# Patient Record
Sex: FEMALE | Race: WHITE | Employment: FULL TIME | URBAN - NONMETROPOLITAN AREA
[De-identification: names, ages, dates, MRNs, and addresses within clinical notes are randomized per-mention and may not be internally consistent; named-entity substitution may affect disease eponyms.]

---

## 2021-01-05 ENCOUNTER — HOSPITAL ENCOUNTER (EMERGENCY)
Age: 46
Discharge: HOME | End: 2021-01-05
Attending: FAMILY MEDICINE
Payer: MEDICAID

## 2021-01-05 VITALS
OXYGEN SATURATION: 98 % | WEIGHT: 230 LBS | RESPIRATION RATE: 20 BRPM | BODY MASS INDEX: 32.2 KG/M2 | HEART RATE: 88 BPM | DIASTOLIC BLOOD PRESSURE: 98 MMHG | HEIGHT: 71 IN | TEMPERATURE: 97.9 F | SYSTOLIC BLOOD PRESSURE: 152 MMHG

## 2021-01-05 DIAGNOSIS — J01.00 ACUTE NON-RECURRENT MAXILLARY SINUSITIS: Primary | ICD-10-CM

## 2021-01-05 PROCEDURE — 99282 EMERGENCY DEPT VISIT SF MDM: CPT

## 2021-01-05 RX ORDER — CEPHALEXIN 500 MG/1
500 CAPSULE ORAL 3 TIMES DAILY
Qty: 30 CAP | Refills: 0 | Status: SHIPPED | OUTPATIENT
Start: 2021-01-05 | End: 2021-01-15

## 2021-01-05 RX ORDER — FEXOFENADINE HCL 60 MG
60 TABLET ORAL DAILY
COMMUNITY

## 2021-01-05 NOTE — LETTER
[address] EMERGENCY DEPT 
[facility] [place] [address] 
[phone] Work/School Note Date: 1/5/2021 To Whom It May concern: 
 
Fahad Guerra was seen and treated today in the emergency room by the following provider(s): 
Attending Provider: Andrew Matson MD. Fahad Guerra is excused from work/school on 01/05/21 and 01/06/21. She is medically clear to return to work/school on 1/7/2021. Sincerely, Shayna Almanza MD

## 2021-01-05 NOTE — ED TRIAGE NOTES
Patient C/O sinus drainage, scratchy sore throat, and mild cough for 2 days. Denies fever, shortness of breath, and body aches.

## 2021-01-05 NOTE — ED PROVIDER NOTES
EMERGENCY DEPARTMENT HISTORY AND PHYSICAL EXAM      Date: 1/5/2021  Patient Name: Maurice Mcgregor    History of Presenting Illness     Chief Complaint   Patient presents with    Cough    Sore Throat       History Provided By: Patient    HPI: Maurice Mcgregor, 39 y.o. female presents to the ED with complaints of a cough and sore throat that began about two days ago and became worse yesterday. Patient states that she believes it could be a sinus infection and that she normally experiences itchy eyes and a runny nose normally due to allergies, but has had a cough, sore/irritated throat, congestion and the feeling of her ears being full/painful and that they are repeatedly popping. She also notes that she has been feeling pain along her forehead and maxillary sinuses. Denies any fever, chills, wheezing or shortness of breath. There are no other complaints, changes, or physical findings at this time. PCP: Seema Pardo MD    No current facility-administered medications on file prior to encounter. Current Outpatient Medications on File Prior to Encounter   Medication Sig Dispense Refill    fexofenadine (ALLEGRA) 60 mg tablet Take 60 mg by mouth daily. Indications: inflammation of the nose due to an allergy         Past History     Past Medical History:  Past Medical History:   Diagnosis Date    Anemia     Anxiety     Bipolar affective disorder ([facility] [address]     Gestational (pregnancy-induced) hypertension without significant proteinuria, complicating childbirth     Vertigo        Past Surgical History:  History reviewed. No pertinent surgical history. Family History:  History reviewed. No pertinent family history. Social History:  Social History     Tobacco Use    Smoking status: Never Smoker    Smokeless tobacco: Never Used   Substance Use Topics    Alcohol use: Not Currently    Drug use: Never       Allergies:   Allergies   Allergen Reactions    Codeine Nausea and Vomiting    Iodine Nausea and Vomiting    Pcn [Penicillins] Hives         Review of Systems   Review of Systems   Constitutional: Negative for chills, fatigue and fever. HENT: Positive for congestion, ear pain, postnasal drip, rhinorrhea, sinus pain and sore throat. Negative for ear discharge, facial swelling and trouble swallowing. Eyes: Negative for pain, discharge, redness and itching. Respiratory: Positive for cough. Negative for chest tightness, shortness of breath and wheezing. Cardiovascular: Negative for chest pain, palpitations and leg swelling. Gastrointestinal: Negative for diarrhea, nausea and vomiting. Genitourinary: Negative for dysuria, frequency and urgency. Musculoskeletal: Negative for arthralgias, back pain, neck pain and neck stiffness. Skin: Negative for color change, rash and wound. Allergic/Immunologic: Negative for immunocompromised state. Neurological: Positive for headaches. Negative for dizziness, syncope and numbness. Psychiatric/Behavioral: Negative for agitation and suicidal ideas. The patient is not nervous/anxious. Physical Exam   Physical Exam  Vitals signs and nursing note reviewed. Constitutional:       General: She is not in acute distress. Appearance: She is well-developed. She is not ill-appearing or diaphoretic. HENT:      Head: Normocephalic and atraumatic. Right Ear: Ear canal normal. Drainage present. No swelling or tenderness. Left Ear: Tympanic membrane and ear canal normal. No drainage, swelling or tenderness. Nose: Congestion present. No rhinorrhea. Mouth/Throat:      Mouth: Mucous membranes are dry. Pharynx: Oropharynx is clear. No pharyngeal swelling or posterior oropharyngeal erythema. Eyes:      Conjunctiva/sclera: Conjunctivae normal.      Pupils: Pupils are equal, round, and reactive to light. Neck:      Musculoskeletal: Normal range of motion and neck supple. Cardiovascular:      Rate and Rhythm: Normal rate and regular rhythm.       Heart sounds: Normal heart sounds. No murmur. No friction rub. No gallop. Pulmonary:      Effort: Pulmonary effort is normal. No respiratory distress. Breath sounds: Normal breath sounds. No wheezing. Abdominal:      General: Bowel sounds are normal. There is no distension. Palpations: Abdomen is soft. Tenderness: There is no abdominal tenderness. Skin:     General: Skin is warm and dry. Coloration: Skin is not pale. Findings: No erythema or rash. Neurological:      Mental Status: She is alert and oriented to person, place, and time. Psychiatric:         Mood and Affect: Mood normal.         Behavior: Behavior normal.         Diagnostic Study Results     Labs -   No results found for this or any previous visit (from the past 12 hour(s)). Radiologic Studies -   No orders to display     CT Results  (Last 48 hours)    None        CXR Results  (Last 48 hours)    None            Medical Decision Making   I am the first provider for this patient. I reviewed the vital signs, available nursing notes, past medical history, past surgical history, family history and social history. Vital Signs-Reviewed the patient's vital signs. Patient Vitals for the past 12 hrs:   Temp Pulse Resp BP SpO2   01/05/21 1122     98 %   01/05/21 1111 97.9 °F (36.6 °C) 88 20 (!) 152/98 98 %       Records Reviewed: Nursing Notes    The patient presents with     ED Course:   Initial assessment performed. The patients presenting problems have been discussed, and they are in agreement with the care plan formulated and outlined with them. I have encouraged them to ask questions as they arise throughout their visit. Provider Notes (Medical Decision Making): PROCEDURES      Consultations:     Consultations:     Disposition     Disposition:     Discharged    PLAN:  1.    Discharge Medication List as of 1/5/2021 11:36 AM      START taking these medications    Details   cephALEXin (Keflex) 500 mg capsule Take 1 Cap by mouth three (3) times daily for 10 days. , Normal, Disp-30 Cap, R-0         CONTINUE these medications which have NOT CHANGED    Details   fexofenadine (ALLEGRA) 60 mg tablet Take 60 mg by mouth daily. Indications: inflammation of the nose due to an allergy, Historical Med           2. Follow-up Information     Follow up With Specialties Details Why Precious Loredo MD Internal Medicine  As needed [address] [place] [id number] [phone]          Return to ED if worse     Diagnosis     Clinical Impression:   1. Acute non-recurrent maxillary sinusitis        By signing my name below, I, Suzy Lewis, attest that this documentation has been prepared under the direction and in presence of Dr. Han Torres on 01/05/21.  Electronically signed: Suzy Lewis, 01/05/21, 11:27 AM

## 2021-03-01 ENCOUNTER — HOSPITAL ENCOUNTER (EMERGENCY)
Age: 46
Discharge: HOME | End: 2021-03-01
Attending: EMERGENCY MEDICINE
Payer: MEDICAID

## 2021-03-01 ENCOUNTER — APPOINTMENT (OUTPATIENT)
Dept: GENERAL RADIOLOGY | Age: 46
End: 2021-03-01
Attending: EMERGENCY MEDICINE
Payer: MEDICAID

## 2021-03-01 VITALS
TEMPERATURE: 97.9 F | RESPIRATION RATE: 18 BRPM | DIASTOLIC BLOOD PRESSURE: 81 MMHG | BODY MASS INDEX: 39.2 KG/M2 | HEIGHT: 71 IN | SYSTOLIC BLOOD PRESSURE: 124 MMHG | HEART RATE: 80 BPM | WEIGHT: 280 LBS | OXYGEN SATURATION: 99 %

## 2021-03-01 DIAGNOSIS — S50.11XA CONTUSION OF RIGHT FOREARM, INITIAL ENCOUNTER: Primary | ICD-10-CM
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
## 2021-03-01 DIAGNOSIS — S80.811A ABRASION OF ANTERIOR RIGHT LOWER LEG, INITIAL ENCOUNTER: ICD-10-CM

## 2021-03-01 PROCEDURE — 99283 EMERGENCY DEPT VISIT LOW MDM: CPT

## 2021-03-01 PROCEDURE — 74011250637 HC RX REV CODE- 250/637: Performed by: EMERGENCY MEDICINE

## 2021-03-01 PROCEDURE — 73090 X-RAY EXAM OF FOREARM: CPT

## 2021-03-01 PROCEDURE — 73110 X-RAY EXAM OF WRIST: CPT

## 2021-03-01 RX ORDER — OXYCODONE AND ACETAMINOPHEN 5; 325 MG/1; MG/1
1 TABLET ORAL
Status: COMPLETED | OUTPATIENT
Start: 2021-03-01 | End: 2021-03-01

## 2021-03-01 RX ADMIN — OXYCODONE HYDROCHLORIDE AND ACETAMINOPHEN 1 TABLET: 5; 325 TABLET ORAL at 04:41

## 2021-03-01 NOTE — ED TRIAGE NOTES
States while attempting to assist spouse this morning while he was falling. Spouse landed on right wrist/forearm.  obvious deformity noted

## 2021-03-01 NOTE — DISCHARGE INSTRUCTIONS
SPECIFIC PATIENT INSTRUCTIONS FROM THE PHYSICIAN WHO TREATED YOU IN THE ER TODAY:  1. Over-the-counter Tylenol or Motrin for pain. 2. Apply ice to the area for the first 24-48 hours to help with swelling. 3. Return for increasing pain, or any other problems.

## 2021-03-01 NOTE — ED PROVIDER NOTES
[facility] EMERGENCY DEPT      4:36 AM    Date: 3/1/2021  Patient Name: Nestora Sacks    History of Presenting Illness     Chief Complaint   Patient presents with   [employer] Fall    Wrist Pain       39 y.o. female with noted past medical history who presents to the emergency department with right distal forearm pain. The patient states she was in her usual state of health today when her  was starting to fall and she tried to brace his fall and subsequently laid on her right wrist.  She has some distal right forearm pain that is gotten worse since she fell approximately 30 minutes prior to arrival in the ER. She denies any fall other injuries. No other complaints. Patient denies any other associated signs or symptoms. Patient denies any other complaints. Nursing notes regarding the HPI and triage nursing notes were reviewed. Prior medical records were reviewed. Current Outpatient Medications   Medication Sig Dispense Refill    fexofenadine (ALLEGRA) 60 mg tablet Take 60 mg by mouth daily. Indications: inflammation of the nose due to an allergy         Past History     Past Medical History:  Past Medical History:   Diagnosis Date    Anemia     Anxiety     Bipolar affective disorder ([address]     Gestational (pregnancy-induced) hypertension without significant proteinuria, complicating childbirth     Vertigo        Past Surgical History:  Past Surgical History:   Procedure Laterality Date    AL ABDOMEN SURGERY PROC UNLISTED      intestinal blockage at age 9       Family History:  History reviewed. No pertinent family history. Social History:  Social History     Tobacco Use    Smoking status: Never Smoker    Smokeless tobacco: Never Used   Substance Use Topics    Alcohol use: Not Currently    Drug use: Never       Allergies:   Allergies   Allergen Reactions    Amoxicillin Hives    Iodine Nausea and Vomiting    Pcn [Penicillins] Hives    Codeine Nausea and Vomiting       Patient's primary care provider (as noted in EPIC):  Pravin Bryant MD    Review of Systems   Constitutional: Negative for diaphoresis. HENT: Negative for congestion. Eyes: Negative for discharge. Respiratory: Negative for stridor. Cardiovascular: Negative for palpitations. Gastrointestinal: Negative for diarrhea. Genitourinary: Negative for flank pain. Musculoskeletal: Negative for back pain. Neurological: Negative for weakness. Psychiatric/Behavioral: Negative for hallucinations. All other systems reviewed and are negative. Visit Vitals  /89 (BP 1 Location: Left upper arm)   Pulse 88   Temp 97.9 °F (36.6 °C)   Resp 18   Ht 5' 11\" (1.803 m)   Wt 127 kg (280 lb)   SpO2 99%   BMI 39.05 kg/m²       PHYSICAL EXAM:    CONSTITUTIONAL: Alert, in no apparent distress; well-developed; well-nourished. HEAD:  Normocephalic, atraumatic. No Battles sign. No Raccoons eyes. EYES:  EOM's intact. Normal conjunctiva. Anicteric sclera. ENTM: Nose: no rhinorrhea; Oropharynx:  mucous membranes moist    Neck:  No JVD. No cervical vertebral bony point tenderness or step-off. RESP: Chest clear, equal breath sounds. CARDIOVASCULAR:  Regular rate and rhythm. No murmurs, rubs, or gallops. GI: Normal bowel sounds, abdomen soft and non-tender. No masses or organomegaly. : No costo-vertebral angle tenderness. BACK:  No TLS vertebral bony point tenderness or step-off. UPPER EXT:  Normal inspection. Affected area: Right distal forearm has mild focal reproducible tenderness to palpation. No obvious deformity. No rash, lesions, bruising or bites. LOWER EXT: No edema, no calf tenderness. Distal pulses intact. NEURO: Grossly normal motor and sensation. SKIN: No rashes; Normal for age and stage. PSYCH:  Alert and oriented, normal affect.     Diagnostic Study Results     Abnormal lab results from this emergency department encounter:  Labs Reviewed - No data to display    Lab values for this patient within approximately the last 12 hours:  No results found for this or any previous visit (from the past 12 hour(s)). Radiologist and cardiologist interpretations if available at time of this note:  No results found. Medication(s) ordered for patient during this emergency visit encounter:  Medications   oxyCODONE-acetaminophen (PERCOCET) 5-325 mg per tablet 1 Tab (1 Tab Oral Given 3/1/21 9058)       Medical Decision Making     I am the first provider for this patient. I reviewed the vital signs, available nursing notes, past medical history, past surgical history, family history and social history. Vital Signs:  Reviewed the patient's vital signs. DIFFERENTIAL DIAGNOSES/ MEDICAL DECISION MAKING:  Contusion, sprain, dislocation, fracture, ligamentous tear/ disruption or a combination of the above. Right forearm xray as interpreted by the emergency physician: No fracture and no dislocation. Right wrist x-ray as interpreted by the emergency physician: No fracture and no dislocation. ED COURSE:      SPECIFIC PATIENT INSTRUCTIONS FROM THE PHYSICIAN WHO TREATED YOU IN THE ER TODAY:  1. Over-the-counter Tylenol or Motrin for pain. 2. Apply ice to the area for the first 24-48 hours to help with swelling. 3. Return for increasing pain, or any other problems. Patient is improved, resting quietly and comfortably. The patient will be discharged home. The patient was reassured that these symptoms do not appear to represent a serious or life threatening condition at this time. Warning signs of worsening condition were discussed and understood by the patient. Based on patient's age, coexisting illness, exam, and the results of this ED evaluation, the decision to treat as an outpatient was made. Based on the information available at time of discharge, acute pathology requiring immediate intervention was deemed relative unlikely.      While it is impossible to completely exclude the possibility of underlying serious disease or worsening of condition, I feel the relative likelihood is extremely low. I discussed this uncertainty with the patient, who understood ED evaluation and treatment and felt comfortable with the outpatient treatment plan. All questions regarding care, test results, and follow up were answered. The patient is stable and appropriate to discharge. They understand that they should return to the emergency department for any new or worsening symptoms. I stressed the importance of follow up for repeat assessment and possibly further evaluation/treatment. Dictation disclaimer:  Please note that this dictation was completed with [employer], the [employer] voice recognition software. Quite often unanticipated grammatical, syntax, homophones, and other interpretive errors are inadvertently transcribed by the computer software. Please disregard these errors. Please excuse any errors that have escaped final proofreading. Coding Diagnoses     Clinical Impression:   1. Contusion of right forearm, initial encounter    2. Fall, initial encounter    3. Abrasion of anterior right lower leg, initial encounter        Disposition     Disposition: Discharge. MARISOL Ceja Board Certified Emergency Physician    Provider Attestation:  If a scribe was utilized in generation of this patient record, I personally performed the services described in the documentation, reviewed the documentation, as recorded by the scribe in my presence, and it accurately records the patient's history of presenting illness, review of systems, patient physical examination, and procedures performed by me as the attending physician. MARISOL Ceja Board Certified Emergency Physician  3/1/2021.  4:36 AM

## 2021-03-31 ENCOUNTER — HOSPITAL ENCOUNTER (EMERGENCY)
Age: 46
Discharge: HOME | End: 2021-04-01
Attending: EMERGENCY MEDICINE
Payer: MEDICAID

## 2021-03-31 DIAGNOSIS — R11.2 NON-INTRACTABLE VOMITING WITH NAUSEA, UNSPECIFIED VOMITING TYPE: ICD-10-CM

## 2021-03-31 DIAGNOSIS — N39.0 ACUTE UTI: ICD-10-CM

## 2021-03-31 DIAGNOSIS — R19.7 DIARRHEA, UNSPECIFIED TYPE: Primary | ICD-10-CM

## 2021-03-31 DIAGNOSIS — R10.84 ABDOMINAL PAIN, GENERALIZED: ICD-10-CM

## 2021-03-31 DIAGNOSIS — Z87.19 HISTORY OF IRRITABLE BOWEL SYNDROME: ICD-10-CM

## 2021-03-31 PROCEDURE — 96374 THER/PROPH/DIAG INJ IV PUSH: CPT

## 2021-03-31 PROCEDURE — 99283 EMERGENCY DEPT VISIT LOW MDM: CPT

## 2021-03-31 NOTE — LETTER
[address] EMERGENCY DEPT 
[facility] [address] 
[phone] Work/School Note Date: 3/31/2021 To Whom It May concern: 
 
Yue Anaya was seen and treated today in the emergency room by the following provider(s): 
Attending Provider: Fernando Dong MD. Nahed Dean is excused from work/school on 04/01/21 and 04/02/21. She is medically clear to return to work/school on 4/3/2021. Sincerely, 
 
 
 
 
Abhinav Humphreys

## 2021-04-01 VITALS
HEIGHT: 71 IN | SYSTOLIC BLOOD PRESSURE: 134 MMHG | TEMPERATURE: 98.5 F | BODY MASS INDEX: 39.2 KG/M2 | OXYGEN SATURATION: 100 % | RESPIRATION RATE: 20 BRPM | HEART RATE: 82 BPM | DIASTOLIC BLOOD PRESSURE: 76 MMHG | WEIGHT: 280 LBS

## 2021-04-01 LAB
ALBUMIN SERPL-MCNC: 3.8 G/DL (ref 3.5–4.7)
ALBUMIN/GLOB SERPL: 1 {RATIO}
ALP SERPL-CCNC: 69 U/L (ref 38–126)
ALT SERPL-CCNC: 25 U/L (ref 3–52)
ANION GAP SERPL CALC-SCNC: 9 MMOL/L
APPEARANCE UR: ABNORMAL
AST SERPL W P-5'-P-CCNC: 22 U/L (ref 14–74)
BACTERIA URNS QL MICRO: ABNORMAL /HPF
BASOPHILS # BLD: 0 K/UL (ref 0–0.1)
BASOPHILS NFR BLD: 0 % (ref 0–2)
BILIRUB DIRECT SERPL-MCNC: <0.1 MG/DL (ref 0–0.3)
BILIRUB SERPL-MCNC: 0.3 MG/DL (ref 0.2–1)
BILIRUB UR QL: NEGATIVE
BUN SERPL-MCNC: 8 MG/DL (ref 9–21)
BUN/CREAT SERPL: 10
CA-I BLD-MCNC: 9.1 MG/DL (ref 8.5–10.5)
CHLORIDE SERPL-SCNC: 103 MMOL/L (ref 94–111)
CO2 SERPL-SCNC: 26 MMOL/L (ref 21–33)
COLOR UR: ABNORMAL
CREAT SERPL-MCNC: 0.8 MG/DL (ref 0.7–1.2)
EOSINOPHIL # BLD: 0.1 K/UL (ref 0–0.4)
EOSINOPHIL NFR BLD: 1 % (ref 0–5)
EPITH CASTS URNS QL MICRO: ABNORMAL /LPF (ref 0–20)
ERYTHROCYTE [DISTWIDTH] IN BLOOD BY AUTOMATED COUNT: 14.5 % (ref 11.6–14.5)
GLOBULIN SER CALC-MCNC: 3.9 G/DL
GLUCOSE SERPL-MCNC: 91 MG/DL (ref 70–110)
GLUCOSE UR STRIP.AUTO-MCNC: NEGATIVE MG/DL
HCT VFR BLD AUTO: 38.1 % (ref 35–45)
HGB BLD-MCNC: 12.1 G/DL (ref 12–16)
HGB UR QL STRIP: ABNORMAL
IMM GRANULOCYTES # BLD AUTO: 0 K/UL
IMM GRANULOCYTES NFR BLD AUTO: 0 %
KETONES UR QL STRIP.AUTO: NEGATIVE MG/DL
LEUKOCYTE ESTERASE UR QL STRIP.AUTO: ABNORMAL
LIPASE SERPL-CCNC: 25 U/L (ref 10–57)
LYMPHOCYTES # BLD: 1.8 K/UL (ref 0.9–3.6)
LYMPHOCYTES NFR BLD: 23 % (ref 21–52)
MCH RBC QN AUTO: 26.5 PG (ref 24–34)
MCHC RBC AUTO-ENTMCNC: 31.8 G/DL (ref 31–37)
MCV RBC AUTO: 83.4 FL (ref 74–97)
MONOCYTES # BLD: 0.7 K/UL (ref 0.05–1.2)
MONOCYTES NFR BLD: 9 % (ref 3–10)
NEUTS SEG # BLD: 5.3 K/UL (ref 1.8–8)
NEUTS SEG NFR BLD: 67 % (ref 40–73)
NITRITE UR QL STRIP.AUTO: POSITIVE
PH UR STRIP: 6.5 [PH] (ref 5–9)
PLATELET # BLD AUTO: 238 K/UL (ref 135–420)
PMV BLD AUTO: 10.9 FL (ref 9.2–11.8)
POTASSIUM SERPL-SCNC: 3.8 MMOL/L (ref 3.2–5.1)
PROT SERPL-MCNC: 7.7 G/DL (ref 6.1–8.4)
PROT UR STRIP-MCNC: 30 MG/DL
RBC # BLD AUTO: 4.57 M/UL (ref 4.2–5.3)
RBC #/AREA URNS HPF: ABNORMAL /HPF (ref 0–2)
SODIUM SERPL-SCNC: 138 MMOL/L (ref 135–145)
SP GR UR REFRACTOMETRY: 1.02 (ref 1–1.03)
UROBILINOGEN UR QL STRIP.AUTO: 0.2 EU/DL (ref 0.2–1)
WBC # BLD AUTO: 7.9 K/UL (ref 4.6–13.2)
WBC URNS QL MICRO: >100 /HPF (ref 0–4)

## 2021-04-01 PROCEDURE — 80048 BASIC METABOLIC PNL TOTAL CA: CPT

## 2021-04-01 PROCEDURE — 74011250636 HC RX REV CODE- 250/636: Performed by: EMERGENCY MEDICINE

## 2021-04-01 PROCEDURE — 36415 COLL VENOUS BLD VENIPUNCTURE: CPT

## 2021-04-01 PROCEDURE — 80076 HEPATIC FUNCTION PANEL: CPT

## 2021-04-01 PROCEDURE — 83690 ASSAY OF LIPASE: CPT

## 2021-04-01 PROCEDURE — 85025 COMPLETE CBC W/AUTO DIFF WBC: CPT

## 2021-04-01 PROCEDURE — 81001 URINALYSIS AUTO W/SCOPE: CPT

## 2021-04-01 RX ORDER — NITROFURANTOIN (MACROCRYSTALS) 100 MG/1
100 CAPSULE ORAL 2 TIMES DAILY
Qty: 14 CAP | Refills: 0 | Status: SHIPPED | OUTPATIENT
Start: 2021-04-01 | End: 2021-04-08

## 2021-04-01 RX ORDER — OMEPRAZOLE 20 MG/1
20 CAPSULE, DELAYED RELEASE ORAL DAILY
COMMUNITY
End: 2021-08-30

## 2021-04-01 RX ORDER — DIPHENOXYLATE HYDROCHLORIDE AND ATROPINE SULFATE 2.5; .025 MG/1; MG/1
1 TABLET ORAL
Qty: 20 TAB | Refills: 0 | Status: SHIPPED | OUTPATIENT
Start: 2021-04-01

## 2021-04-01 RX ORDER — ONDANSETRON 2 MG/ML
4 INJECTION INTRAMUSCULAR; INTRAVENOUS
Status: COMPLETED | OUTPATIENT
Start: 2021-04-01 | End: 2021-04-01

## 2021-04-01 RX ORDER — ONDANSETRON 4 MG/1
8 TABLET, FILM COATED ORAL
Qty: 12 TAB | Refills: 0 | Status: SHIPPED | OUTPATIENT
Start: 2021-04-01 | End: 2022-01-10

## 2021-04-01 RX ADMIN — SODIUM CHLORIDE 1000 ML: 9 INJECTION, SOLUTION INTRAVENOUS at 00:39

## 2021-04-01 RX ADMIN — ONDANSETRON 4 MG: 2 INJECTION INTRAMUSCULAR; INTRAVENOUS at 00:39

## 2021-04-01 NOTE — DISCHARGE INSTRUCTIONS
SPECIFIC PATIENT INSTRUCTIONS FROM THE PHYSICIAN WHO TREATED YOU IN THE ER TODAY:  1. Zofran for nausea and/or vomiting. 2. Over the counter imodium for diarrhea. 3. FOLLOW UP APPOINTMENT:  Your primary doctor in 3-4 days. 4. IF lomotil was prescribed, take it for diarrhea not controlled after using imodium for at least 24 hours. 5.  Macrodantin as prescribed until finished. 6.  Have you blood pressure recheck by your doctor this week. It was elevated during your Emergency Department visit today. In the days before you see your doctor, recheck your blood pressure at home 2 times a day at the same times. For example, you may decide to record your blood pressure at 8 am and 9 pm every day. Or 7 am and 7 pm every day. Then take this list of recorded blood pressure readings to your doctor when you follow up with them. This will help guide them about what needs to be done with your blood pressure, if anything.

## 2021-04-01 NOTE — ED PROVIDER NOTES
[facility] EMERGENCY DEPT          12:04 AM    Date: 3/31/2021  Patient Name: John Paul Kendall    History of Presenting Illness     Chief Complaint   Patient presents with    Abdominal Pain    Diarrhea    Headache    Nausea       39 y.o. female with noted past medical history who presents to the emergency department with diarrhea and abdominal pain. The patient states she was in her usual state of health and has known history of irritable bowel disease. She states that she has intermittent diarrhea and abdominal cramping with that. However 3 days ago after eating some food she had 2 episodes of nausea with nonbloody nonbilious emesis at that time. Since then she is had intermittent nausea to the present. From the moment of eating that food when she vomited ~present she had intermittent diarrhea to the presence been watery and nonbloody. She also had some migratory generalized abdominal cramping that continues to the present. She describes the cramping as being mild to moderate depending on the particular time. No fever or chills. No UTI symptoms. No vaginal discharge or bleeding. Patient denies any other associated signs or symptoms. Patient denies any other complaints. Nursing notes regarding the HPI and triage nursing notes were reviewed. Prior medical records were reviewed. Current Outpatient Medications   Medication Sig Dispense Refill    lurasidone (Latuda) 80 mg tab tablet Take 80 mg by mouth daily (with breakfast).  omeprazole (PRILOSEC) 20 mg capsule Take 20 mg by mouth daily.  fexofenadine (ALLEGRA) 60 mg tablet Take 60 mg by mouth daily.  Indications: inflammation of the nose due to an allergy         Past History     Past Medical History:  Past Medical History:   Diagnosis Date    Anemia     Anxiety     Bipolar affective disorder (HCC)     Gastrointestinal disorder     GERD (gastroesophageal reflux disease)     Gestational (pregnancy-induced) hypertension without significant proteinuria, complicating childbirth     Hiatal hernia     Vertigo        Past Surgical History:  Past Surgical History:   Procedure Laterality Date    CO ABDOMEN SURGERY PROC UNLISTED      intestinal blockage at age 9       Family History:  History reviewed. No pertinent family history. Social History:  Social History     Tobacco Use    Smoking status: Never Smoker    Smokeless tobacco: Never Used   Substance Use Topics    Alcohol use: Not Currently    Drug use: Never       Allergies: Allergies   Allergen Reactions    Amoxicillin Hives    Iodine Nausea and Vomiting    Pcn [Penicillins] Hives    Codeine Nausea and Vomiting       Patient's primary care provider (as noted in EPIC): Jaye Fink MD    Review of Systems   Constitutional: Negative for diaphoresis. HENT: Negative for congestion. Eyes: Negative for discharge. Respiratory: Negative for stridor. Cardiovascular: Negative for palpitations. Gastrointestinal: Positive for abdominal pain, diarrhea and nausea. Negative for vomiting. Endocrine: Negative for heat intolerance. Genitourinary: Negative for flank pain. Musculoskeletal: Negative for back pain. Neurological: Negative for weakness. Psychiatric/Behavioral: Negative for hallucinations. All other systems reviewed and are negative. Visit Vitals  BP (!) 146/104   Pulse 88   Temp 98.5 °F (36.9 °C)   Resp 20   Ht 5' 11\" (1.803 m)   Wt 127 kg (280 lb)   SpO2 97%   BMI 39.05 kg/m²       PHYSICAL EXAM:    CONSTITUTIONAL:  Alert, in no apparent distress;  well developed;  well nourished. HEAD:  Normocephalic, atraumatic. EYES:  EOMI. Non-icteric sclera. Normal conjunctiva. ENTM:  Nose:  no rhinorrhea. Throat:  no erythema or exudate, mucous membranes moist.  NECK:  No JVD. Supple  RESPIRATORY:  Chest clear, equal breath sounds, good air movement. CARDIOVASCULAR:  Regular rate and rhythm. No murmurs, rubs, or gallops.     GI: Normal bowel sounds, abdomen soft with mild diffuse abdominal tenderness to palpation. No rebound or guarding. No palpable masses. BACK:  Non-tender. UPPER EXT:  Normal inspection. LOWER EXT:  No edema, no calf tenderness. Distal pulses intact. NEURO:  Moves all four extremities, and grossly normal motor exam.  SKIN:  No rashes;  Normal for age. PSYCH:  Alert and normal affect. DIFFERENTIAL DIAGNOSES/ MEDICAL DECISION MAKING:  Viral vomiting and diarrhea, food poisoning, bacterial vomiting and diarrhea. Lower on differential diagnosis in this patient is early small bowel obstruction (given diarrhea as well),  irritable bowel syndrome, crohn's disease, or ulcerative colitis.     Diagnostic Study Results     Abnormal lab results from this emergency department encounter:  Labs Reviewed   METABOLIC PANEL, BASIC - Abnormal; Notable for the following components:       Result Value    BUN 8 (*)     All other components within normal limits   URINALYSIS W/MICROSCOPIC - Abnormal; Notable for the following components:    Appearance Cloudy (*)     Protein 30 (*)     Blood Moderate (*)     Nitrites Positive (*)     Leukocyte Esterase Large (*)     WBC >100 (*)     Bacteria 4+ (*)     All other components within normal limits   CBC WITH AUTOMATED DIFF   LIPASE   HEPATIC FUNCTION PANEL       Lab values for this patient within approximately the last 12 hours:  Recent Results (from the past 12 hour(s))   CBC WITH AUTOMATED DIFF    Collection Time: 04/01/21 12:15 AM   Result Value Ref Range    WBC 7.9 4.6 - 13.2 K/uL    RBC 4.57 4.20 - 5.30 M/uL    HGB 12.1 12.0 - 16.0 g/dL    HCT 38.1 35.0 - 45.0 %    MCV 83.4 74.0 - 97.0 FL    MCH 26.5 24.0 - 34.0 PG    MCHC 31.8 31.0 - 37.0 g/dL    RDW 14.5 11.6 - 14.5 %    PLATELET 711 621 - 525 K/uL    MPV 10.9 9.2 - 11.8 FL    NEUTROPHILS 67 40 - 73 %    LYMPHOCYTES 23 21 - 52 %    MONOCYTES 9 3 - 10 %    EOSINOPHILS 1 0 - 5 %    BASOPHILS 0 0 - 2 %    IMMATURE GRANULOCYTES 0 % ABS. NEUTROPHILS 5.3 1.8 - 8.0 K/UL    ABS. LYMPHOCYTES 1.8 0.9 - 3.6 K/UL    ABS. MONOCYTES 0.7 0.05 - 1.2 K/UL    ABS. EOSINOPHILS 0.1 0.0 - 0.4 K/UL    ABS. BASOPHILS 0.0 0.0 - 0.1 K/UL    ABS. IMM. GRANS. 0.0 K/UL   METABOLIC PANEL, BASIC    Collection Time: 04/01/21 12:15 AM   Result Value Ref Range    Sodium 138 135 - 145 mmol/L    Potassium 3.8 3.2 - 5.1 mmol/L    Chloride 103 94 - 111 mmol/L    CO2 26 21 - 33 mmol/L    Anion gap 9 mmol/L    Glucose 91 70 - 110 mg/dL    BUN 8 (L) 9 - 21 mg/dL    Creatinine 0.80 0.70 - 1.20 mg/dL    BUN/Creatinine ratio 10      GFR est AA >60 ml/min/1.73m2    GFR est non-AA >60 ml/min/1.73m2    Calcium 9.1 8.5 - 10.5 mg/dL   LIPASE    Collection Time: 04/01/21 12:15 AM   Result Value Ref Range    Lipase 25 10 - 57 U/L   HEPATIC FUNCTION PANEL    Collection Time: 04/01/21 12:15 AM   Result Value Ref Range    Protein, total 7.7 6.1 - 8.4 g/dL    Albumin 3.8 3.5 - 4.7 g/dL    Globulin 3.9 g/dL    A-G Ratio 1.0      Bilirubin, total 0.3 0.2 - 1.0 mg/dL    Bilirubin, direct <0.1 0.0 - 0.3 mg/dL    Alk. phosphatase 69 38 - 126 U/L    AST (SGOT) 22 14 - 74 U/L    ALT (SGPT) 25 3 - 52 U/L   URINALYSIS W/MICROSCOPIC    Collection Time: 04/01/21 12:22 AM   Result Value Ref Range    Color Yellow/Straw      Appearance Cloudy (A) Clear      Specific gravity 1.020 1.003 - 1.035      pH (UA) 6.5 5.0 - 9.0      Protein 30 (A) Negative mg/dL    Glucose Negative Negative mg/dL    Ketone Negative Negative mg/dL    Bilirubin Negative Negative      Blood Moderate (A) Negative      Urobilinogen 0.2 0.2 - 1.0 EU/dL    Nitrites Positive (A) Negative      Leukocyte Esterase Large (A) Negative      WBC >100 (H) 0 - 4 /hpf    RBC 20-50 0 - 2 /hpf    Epithelial cells Moderate 0 - 20 /lpf    Bacteria 4+ (A) None /hpf       Radiologist and cardiologist interpretations if available at time of this note:  No results found.     Medication(s) ordered for patient during this emergency visit encounter:  Medications   sodium chloride 0.9 % bolus infusion 1,000 mL (1,000 mL IntraVENous New Bag 4/1/21 0039)   ondansetron (ZOFRAN) injection 4 mg (4 mg IntraVENous Given 4/1/21 0039)       Medical Decision Making     I am the first provider for this patient. I reviewed the vital signs, available nursing notes, past medical history, past surgical history, family history and social history. Vital Signs:  Reviewed the patient's vital signs. ED COURSE:      IMPRESSION AND MEDICAL DECISION MAKING:  Based upon the patient's presentation with noted HPI and PE, along with the work up done in the emergency department, I believe that the patient is having vomiting, diarrhea, and abdominal pain from gastroenteritis. I do believe though that the patient is well enough for discharge home. Will prescribe zofran for nausea and vomiting. If vicodin was prescribed, only a short course was prescribed for breakthrough pain. SPECIFIC PATIENT INSTRUCTIONS FROM THE PHYSICIAN WHO TREATED YOU IN THE ER TODAY:  1. Zofran for nausea and/or vomiting. 2. Over the counter imodium for diarrhea. 3. FOLLOW UP APPOINTMENT:  Your primary doctor in 3-4 days. 4. IF lomotil was prescribed, take it for diarrhea not controlled after using imodium for at least 24 hours. 5.  Macrodantin as prescribed until finished. 6.  Have you blood pressure recheck by your doctor this week. It was elevated during your Emergency Department visit today. In the days before you see your doctor, recheck your blood pressure at home 2 times a day at the same times. For example, you may decide to record your blood pressure at 8 am and 9 pm every day. Or 7 am and 7 pm every day. Then take this list of recorded blood pressure readings to your doctor when you follow up with them. This will help guide them about what needs to be done with your blood pressure, if anything. Patient is improved, resting quietly and comfortably.   The patient will be discharged home. The patient was reassured that these symptoms do not appear to represent a serious or life threatening condition at this time. Warning signs of worsening condition were discussed and understood by the patient. Based on patient's age, coexisting illness, exam, and the results of this ED evaluation, the decision to treat as an outpatient was made. Based on the information available at time of discharge, acute pathology requiring immediate intervention was deemed relative unlikely. While it is impossible to completely exclude the possibility of underlying serious disease or worsening of condition, I feel the relative likelihood is extremely low. I discussed this uncertainty with the patient, who understood ED evaluation and treatment and felt comfortable with the outpatient treatment plan. All questions regarding care, test results, and follow up were answered. The patient is stable and appropriate to discharge. They understand that they should return to the emergency department for any new or worsening symptoms. I stressed the importance of follow up for repeat assessment and possibly further evaluation/treatment. Dictation disclaimer:  Please note that this dictation was completed with [employer], the computer voice recognition software. Quite often unanticipated grammatical, syntax, homophones, and other interpretive errors are inadvertently transcribed by the computer software. Please disregard these errors. Please excuse any errors that have escaped final proofreading. Coding Diagnoses     Clinical Impression:   1. Diarrhea, unspecified type    2. Abdominal pain, generalized    3. Non-intractable vomiting with nausea, unspecified vomiting type    4. History of irritable bowel syndrome    5. Acute UTI        Disposition     Disposition: Discharge. MARISOL De Los Santos Board Certified Emergency Physician    Provider Attestation:  If a scribe was utilized in generation of this patient record, I personally performed the services described in the documentation, reviewed the documentation, as recorded by the scribe in my presence, and it accurately records the patient's history of presenting illness, review of systems, patient physical examination, and procedures performed by me as the attending physician. Serg Diaz M.D.   AB Board Certified Emergency Physician  3/31/2021.  12:04 AM

## 2021-08-30 ENCOUNTER — HOSPITAL ENCOUNTER (EMERGENCY)
Age: 46
Discharge: HOME | End: 2021-08-30
Attending: FAMILY MEDICINE
Payer: MEDICAID

## 2021-08-30 VITALS
BODY MASS INDEX: 32.2 KG/M2 | WEIGHT: 230 LBS | SYSTOLIC BLOOD PRESSURE: 137 MMHG | HEIGHT: 71 IN | OXYGEN SATURATION: 97 % | HEART RATE: 92 BPM | TEMPERATURE: 97.9 F | RESPIRATION RATE: 20 BRPM | DIASTOLIC BLOOD PRESSURE: 81 MMHG

## 2021-08-30 DIAGNOSIS — N30.00 ACUTE CYSTITIS WITHOUT HEMATURIA: Primary | ICD-10-CM

## 2021-08-30 LAB
APPEARANCE UR: ABNORMAL
BACTERIA URNS QL MICRO: ABNORMAL /HPF
BILIRUB UR QL: NEGATIVE
COLOR UR: YELLOW
EPITH CASTS URNS QL MICRO: ABNORMAL /LPF (ref 0–20)
GLUCOSE UR STRIP.AUTO-MCNC: NEGATIVE MG/DL
HGB UR QL STRIP: ABNORMAL
KETONES UR QL STRIP.AUTO: NEGATIVE MG/DL
LEUKOCYTE ESTERASE UR QL STRIP.AUTO: ABNORMAL
NITRITE UR QL STRIP.AUTO: NEGATIVE
PH UR STRIP: 7 [PH] (ref 5–8)
PROT UR STRIP-MCNC: NEGATIVE MG/DL
RBC #/AREA URNS HPF: ABNORMAL /HPF (ref 0–2)
SP GR UR REFRACTOMETRY: 1.01 (ref 1–1.03)
UROBILINOGEN UR QL STRIP.AUTO: 0.2 EU/DL (ref 0.2–1)
WBC URNS QL MICRO: ABNORMAL /HPF (ref 0–4)

## 2021-08-30 PROCEDURE — 81001 URINALYSIS AUTO W/SCOPE: CPT

## 2021-08-30 PROCEDURE — 99283 EMERGENCY DEPT VISIT LOW MDM: CPT

## 2021-08-30 RX ORDER — CIPROFLOXACIN 500 MG/1
500 TABLET ORAL 2 TIMES DAILY
Qty: 14 TABLET | Refills: 0 | Status: SHIPPED | OUTPATIENT
Start: 2021-08-30 | End: 2021-09-06

## 2021-08-30 RX ORDER — PHENAZOPYRIDINE HYDROCHLORIDE 200 MG/1
200 TABLET, FILM COATED ORAL 3 TIMES DAILY
Qty: 6 TABLET | Refills: 0 | Status: SHIPPED | OUTPATIENT
Start: 2021-08-30 | End: 2021-09-01

## 2021-08-30 NOTE — ED TRIAGE NOTES
Pt states for the past 3 days she has had pain with urination and urgency x 3 days.   States she has been taking OTC \"bladder pills\" but they are not really helping

## 2021-08-30 NOTE — ED PROVIDER NOTES
EMERGENCY DEPARTMENT HISTORY AND PHYSICAL EXAM      Date: 8/30/2021  Patient Name: Sonya Bellamy    History of Presenting Illness     Chief Complaint   Patient presents with    Urinary Frequency       History Provided By: Patient    HPI: Sonya Bellamy, 55 y.o. female with a past medical history significant No significant past medical history presents to the ED with cc of urinary frequency. Patient's had symptoms for 5 days. She is having urinary frequency and hesitancy. She is having dysuria. She is having some low back pain. She denies any fevers or chills. She denies any nausea or vomiting. There are no other complaints, changes, or physical findings at this time. PCP: None    No current facility-administered medications on file prior to encounter. Current Outpatient Medications on File Prior to Encounter   Medication Sig Dispense Refill    cimetidine (TAGAMET PO) Take  by mouth.  ondansetron hcl (Zofran) 4 mg tablet Take 2 Tabs by mouth every eight (8) hours as needed for Nausea. 12 Tab 0    diphenoxylate-atropine (LomotiL) 2.5-0.025 mg per tablet Take 1 Tab by mouth four (4) times daily as needed for Diarrhea. Max Daily Amount: 4 Tabs. 20 Tab 0    fexofenadine (ALLEGRA) 60 mg tablet Take 60 mg by mouth daily. Indications: inflammation of the nose due to an allergy      [DISCONTINUED] lurasidone (Latuda) 80 mg tab tablet Take 80 mg by mouth daily (with breakfast).  [DISCONTINUED] omeprazole (PRILOSEC) 20 mg capsule Take 20 mg by mouth daily.          Past History     Past Medical History:  Past Medical History:   Diagnosis Date    Anemia     Anxiety     Bipolar affective disorder ([facility] [address]     Gastrointestinal disorder     GERD (gastroesophageal reflux disease)     Gestational (pregnancy-induced) hypertension without significant proteinuria, complicating childbirth     Hiatal hernia     Vertigo        Past Surgical History:  Past Surgical History:   Procedure Laterality Date  MD ABDOMEN SURGERY PROC UNLISTED      intestinal blockage at age 9       Family History:  History reviewed. No pertinent family history. Social History:  Social History     Tobacco Use    Smoking status: Never Smoker    Smokeless tobacco: Never Used   Vaping Use    Vaping Use: Never used   Substance Use Topics    Alcohol use: Not Currently    Drug use: Yes     Types: Marijuana     Comment: medical        Allergies: Allergies   Allergen Reactions    Amoxicillin Hives    Iodine Nausea and Vomiting    Pcn [Penicillins] Hives    Codeine Nausea and Vomiting         Review of Systems     Review of Systems   Constitutional: Negative for fatigue and fever. HENT: Negative for rhinorrhea and sore throat. Respiratory: Negative for cough and shortness of breath. Cardiovascular: Negative for chest pain and palpitations. Gastrointestinal: Negative for abdominal pain, diarrhea, nausea and vomiting. Genitourinary: Positive for dysuria, flank pain, frequency and urgency. Negative for difficulty urinating. Musculoskeletal: Negative for arthralgias and myalgias. Skin: Negative for color change and rash. Neurological: Negative for light-headedness and headaches. Physical Exam     Physical Exam  Vitals and nursing note reviewed. Constitutional:       General: She is awake. She is not in acute distress. Appearance: Normal appearance. She is well-developed. She is obese. She is not ill-appearing, toxic-appearing or diaphoretic. Interventions: Face mask in place. HENT:      Head: Normocephalic and atraumatic. Eyes:      Conjunctiva/sclera: Conjunctivae normal.      Pupils: Pupils are equal, round, and reactive to light. Cardiovascular:      Rate and Rhythm: Normal rate and regular rhythm. Pulses: Normal pulses. Heart sounds: Normal heart sounds. Pulmonary:      Effort: Pulmonary effort is normal.      Breath sounds: Normal breath sounds.    Abdominal:      General: Abdomen is flat. Palpations: Abdomen is soft. Tenderness: There is abdominal tenderness. Comments: Suprapubic tenderness   Musculoskeletal:      Cervical back: Normal range of motion and neck supple. Right lower leg: No edema. Left lower leg: No edema. Skin:     General: Skin is warm and dry. Neurological:      General: No focal deficit present. Mental Status: She is alert and oriented to person, place, and time. GCS: GCS eye subscore is 4. GCS verbal subscore is 5. GCS motor subscore is 6. Psychiatric:         Mood and Affect: Mood and affect normal.         Behavior: Behavior normal. Behavior is cooperative. Thought Content: Thought content normal.         Lab and Diagnostic Study Results     Labs -     Recent Results (from the past 12 hour(s))   URINALYSIS W/ RFLX MICROSCOPIC    Collection Time: 08/30/21 11:50 AM   Result Value Ref Range    Color Yellow      Appearance Cloudy      Specific gravity 1.008 1.005 - 1.030      pH (UA) 7.0 5.0 - 8.0      Protein Negative Negative mg/dL    Glucose Negative Negative mg/dL    Ketone Negative Negative mg/dL    Bilirubin Negative Negative      Blood Small (A) Negative      Urobilinogen 0.2 0.2 - 1.0 EU/dL    Nitrites Negative Negative      Leukocyte Esterase Large (A) Negative     URINE MICROSCOPIC    Collection Time: 08/30/21 11:50 AM   Result Value Ref Range    WBC 20-50 0 - 4 /hpf    RBC 0-5 0 - 2 /hpf    Epithelial cells Few 0 - 20 /lpf    Bacteria 1+ (A) None /hpf       Radiologic Studies -   @lastxrresult@  CT Results  (Last 48 hours)    None        CXR Results  (Last 48 hours)    None            Medical Decision Making   - I am the first provider for this patient. - I reviewed the vital signs, available nursing notes, past medical history, past surgical history, family history and social history. - Initial assessment performed.  The patients presenting problems have been discussed, and they are in agreement with the care plan formulated and outlined with them. I have encouraged them to ask questions as they arise throughout their visit. Vital Signs-Reviewed the patient's vital signs. Patient Vitals for the past 12 hrs:   Temp Pulse Resp BP SpO2   08/30/21 1129 97.9 °F (36.6 °C) 92 20 137/81 97 %       Records Reviewed: Nursing Notes          ED Course:          Provider Notes (Medical Decision Making): MDM       Procedures   Medical Decision Makingedical Decision Making  Performed by: Marito Rondon MD  PROCEDURES:  Procedures       Disposition   Disposition:     Discharged    DISCHARGE PLAN:  1. Current Discharge Medication List      CONTINUE these medications which have NOT CHANGED    Details   cimetidine (TAGAMET PO) Take  by mouth. ondansetron hcl (Zofran) 4 mg tablet Take 2 Tabs by mouth every eight (8) hours as needed for Nausea. Qty: 12 Tab, Refills: 0      diphenoxylate-atropine (LomotiL) 2.5-0.025 mg per tablet Take 1 Tab by mouth four (4) times daily as needed for Diarrhea. Max Daily Amount: 4 Tabs. Qty: 20 Tab, Refills: 0    Associated Diagnoses: Diarrhea, unspecified type      fexofenadine (ALLEGRA) 60 mg tablet Take 60 mg by mouth daily. Indications: inflammation of the nose due to an allergy           2. Follow-up Information     Follow up With Specialties Details Why Chelly Cooney MD Family Medicine  As needed [facility] [id number] [phone]          3. Return to ED if worse   4. Current Discharge Medication List      START taking these medications    Details   ciprofloxacin HCl (Cipro) 500 mg tablet Take 1 Tablet by mouth two (2) times a day for 7 days. Qty: 14 Tablet, Refills: 0  Start date: 8/30/2021, End date: 9/6/2021      phenazopyridine (Pyridium) 200 mg tablet Take 1 Tablet by mouth three (3) times daily for 2 days.   Qty: 6 Tablet, Refills: 0  Start date: 8/30/2021, End date: 9/1/2021               Diagnosis     Clinical Impression:   1. Acute cystitis without hematuria        Attestations:    Tico Valencia MD    Please note that this dictation was completed with [employer], the computer voice recognition software. Quite often unanticipated grammatical, syntax, homophones, and other interpretive errors are inadvertently transcribed by the computer software. Please disregard these errors. Please excuse any errors that have escaped final proofreading. Thank you.

## 2021-08-31 ENCOUNTER — HOSPITAL ENCOUNTER (EMERGENCY)
Age: 46
Discharge: HOME | End: 2021-08-31
Attending: FAMILY MEDICINE
Payer: MEDICAID

## 2021-08-31 ENCOUNTER — APPOINTMENT (OUTPATIENT)
Dept: CT IMAGING | Age: 46
End: 2021-08-31
Attending: FAMILY MEDICINE
Payer: MEDICAID

## 2021-08-31 VITALS
WEIGHT: 230 LBS | DIASTOLIC BLOOD PRESSURE: 89 MMHG | RESPIRATION RATE: 18 BRPM | OXYGEN SATURATION: 98 % | HEIGHT: 71 IN | TEMPERATURE: 98.3 F | BODY MASS INDEX: 32.2 KG/M2 | SYSTOLIC BLOOD PRESSURE: 148 MMHG | HEART RATE: 95 BPM

## 2021-08-31 DIAGNOSIS — K57.92 ACUTE DIVERTICULITIS: Primary | ICD-10-CM

## 2021-08-31 LAB
ANION GAP SERPL CALC-SCNC: 9 MMOL/L
BASOPHILS # BLD: 0 K/UL (ref 0–0.1)
BASOPHILS NFR BLD: 0 % (ref 0–2)
BUN SERPL-MCNC: 7 MG/DL (ref 9–21)
BUN/CREAT SERPL: 9
CA-I BLD-MCNC: 8.9 MG/DL (ref 8.5–10.5)
CHLORIDE SERPL-SCNC: 104 MMOL/L (ref 94–111)
CO2 SERPL-SCNC: 24 MMOL/L (ref 21–33)
CREAT SERPL-MCNC: 0.8 MG/DL (ref 0.7–1.2)
DIFFERENTIAL METHOD BLD: ABNORMAL
EOSINOPHIL # BLD: 0 K/UL (ref 0–0.4)
EOSINOPHIL NFR BLD: 0 % (ref 0–5)
ERYTHROCYTE [DISTWIDTH] IN BLOOD BY AUTOMATED COUNT: 14.3 % (ref 11.6–14.5)
GLUCOSE SERPL-MCNC: 115 MG/DL (ref 70–110)
HCT VFR BLD AUTO: 34.7 % (ref 35–45)
HGB BLD-MCNC: 11 G/DL (ref 12–16)
IMM GRANULOCYTES # BLD AUTO: 0.1 K/UL (ref 0–0.04)
IMM GRANULOCYTES NFR BLD AUTO: 0 % (ref 0–0.5)
LYMPHOCYTES # BLD: 1.1 K/UL (ref 0.9–3.6)
LYMPHOCYTES NFR BLD: 9 % (ref 21–52)
MCH RBC QN AUTO: 26.2 PG (ref 24–34)
MCHC RBC AUTO-ENTMCNC: 31.7 G/DL (ref 31–37)
MCV RBC AUTO: 82.6 FL (ref 78–100)
MONOCYTES # BLD: 1 K/UL (ref 0.05–1.2)
MONOCYTES NFR BLD: 9 % (ref 3–10)
NEUTS SEG # BLD: 9.7 K/UL (ref 1.8–8)
NEUTS SEG NFR BLD: 82 % (ref 40–73)
NRBC # BLD: 0 K/UL (ref 0–0.01)
NRBC BLD-RTO: 0 PER 100 WBC
PLATELET # BLD AUTO: 217 K/UL (ref 135–420)
PMV BLD AUTO: 10.9 FL (ref 9.2–11.8)
POTASSIUM SERPL-SCNC: 3.8 MMOL/L (ref 3.2–5.1)
RBC # BLD AUTO: 4.2 M/UL (ref 4.2–5.3)
SODIUM SERPL-SCNC: 137 MMOL/L (ref 135–145)
WBC # BLD AUTO: 12 K/UL (ref 4.6–13.2)

## 2021-08-31 PROCEDURE — 96374 THER/PROPH/DIAG INJ IV PUSH: CPT

## 2021-08-31 PROCEDURE — 80048 BASIC METABOLIC PNL TOTAL CA: CPT

## 2021-08-31 PROCEDURE — 96375 TX/PRO/DX INJ NEW DRUG ADDON: CPT

## 2021-08-31 PROCEDURE — 99284 EMERGENCY DEPT VISIT MOD MDM: CPT

## 2021-08-31 PROCEDURE — 85025 COMPLETE CBC W/AUTO DIFF WBC: CPT

## 2021-08-31 PROCEDURE — 74176 CT ABD & PELVIS W/O CONTRAST: CPT

## 2021-08-31 PROCEDURE — 74011250636 HC RX REV CODE- 250/636: Performed by: FAMILY MEDICINE

## 2021-08-31 PROCEDURE — 74011250637 HC RX REV CODE- 250/637: Performed by: FAMILY MEDICINE

## 2021-08-31 RX ORDER — METRONIDAZOLE 250 MG/1
500 TABLET ORAL
Status: COMPLETED | OUTPATIENT
Start: 2021-08-31 | End: 2021-08-31

## 2021-08-31 RX ORDER — ONDANSETRON 2 MG/ML
4 INJECTION INTRAMUSCULAR; INTRAVENOUS ONCE
Status: COMPLETED | OUTPATIENT
Start: 2021-08-31 | End: 2021-08-31

## 2021-08-31 RX ORDER — METRONIDAZOLE 500 MG/1
500 TABLET ORAL 2 TIMES DAILY
Qty: 20 TABLET | Refills: 0 | Status: SHIPPED | OUTPATIENT
Start: 2021-08-31 | End: 2021-09-10

## 2021-08-31 RX ORDER — ONDANSETRON 4 MG/1
4 TABLET, ORALLY DISINTEGRATING ORAL
Qty: 10 TABLET | Refills: 0 | Status: SHIPPED | OUTPATIENT
Start: 2021-08-31 | End: 2022-01-10

## 2021-08-31 RX ORDER — KETOROLAC TROMETHAMINE 30 MG/ML
30 INJECTION, SOLUTION INTRAMUSCULAR; INTRAVENOUS
Status: COMPLETED | OUTPATIENT
Start: 2021-08-31 | End: 2021-08-31

## 2021-08-31 RX ORDER — KETOROLAC TROMETHAMINE 10 MG/1
10 TABLET, FILM COATED ORAL
Qty: 10 TABLET | Refills: 0 | Status: SHIPPED | OUTPATIENT
Start: 2021-08-31 | End: 2022-01-10

## 2021-08-31 RX ORDER — LEVOFLOXACIN 250 MG/1
500 TABLET ORAL
Status: COMPLETED | OUTPATIENT
Start: 2021-08-31 | End: 2021-08-31

## 2021-08-31 RX ADMIN — ONDANSETRON 4 MG: 2 INJECTION INTRAMUSCULAR; INTRAVENOUS at 16:15

## 2021-08-31 RX ADMIN — LEVOFLOXACIN 500 MG: 250 TABLET, FILM COATED ORAL at 18:04

## 2021-08-31 RX ADMIN — METRONIDAZOLE 500 MG: 250 TABLET ORAL at 18:04

## 2021-08-31 RX ADMIN — SODIUM CHLORIDE 1000 ML: 9 INJECTION, SOLUTION INTRAVENOUS at 16:15

## 2021-08-31 RX ADMIN — KETOROLAC TROMETHAMINE 30 MG: 30 INJECTION, SOLUTION INTRAMUSCULAR; INTRAVENOUS at 16:16

## 2021-08-31 NOTE — LETTER
Collette Mirza was seen and treated in our emergency department on 8/31/2021. She may return to work on 9/3/2021. If you have any questions or concerns, please don't hesitate to call.       Dr. Meagan Spaulding
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
## 2021-12-01 ENCOUNTER — HOSPITAL ENCOUNTER (EMERGENCY)
Age: 46
Discharge: LEFT BEFORE DISCHARGE | End: 2021-12-01
Payer: MEDICAID

## 2021-12-01 PROCEDURE — 75810000275 HC EMERGENCY DEPT VISIT NO LEVEL OF CARE

## 2022-01-10 ENCOUNTER — HOSPITAL ENCOUNTER (EMERGENCY)
Age: 47
Discharge: HOME | End: 2022-01-10
Attending: EMERGENCY MEDICINE
Payer: MEDICAID

## 2022-01-10 VITALS
TEMPERATURE: 98 F | BODY MASS INDEX: 31.5 KG/M2 | HEART RATE: 91 BPM | DIASTOLIC BLOOD PRESSURE: 83 MMHG | WEIGHT: 225 LBS | HEIGHT: 71 IN | SYSTOLIC BLOOD PRESSURE: 142 MMHG | OXYGEN SATURATION: 98 % | RESPIRATION RATE: 20 BRPM

## 2022-01-10 DIAGNOSIS — J10.1 INFLUENZA B: Primary | ICD-10-CM

## 2022-01-10 LAB
FLUAV AG NPH QL IA: NEGATIVE
FLUBV AG NOSE QL IA: POSITIVE

## 2022-01-10 PROCEDURE — 99282 EMERGENCY DEPT VISIT SF MDM: CPT

## 2022-01-10 PROCEDURE — 87804 INFLUENZA ASSAY W/OPTIC: CPT

## 2022-01-10 RX ORDER — OSELTAMIVIR PHOSPHATE 75 MG/1
75 CAPSULE ORAL 2 TIMES DAILY
Qty: 10 CAPSULE | Refills: 0 | Status: SHIPPED | OUTPATIENT
Start: 2022-01-10 | End: 2022-01-15

## 2022-01-10 RX ORDER — IBUPROFEN 400 MG/1
400 TABLET ORAL
Qty: 20 TABLET | Refills: 0 | Status: SHIPPED | OUTPATIENT
Start: 2022-01-10
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
## 2022-09-03 ENCOUNTER — HOSPITAL ENCOUNTER (EMERGENCY)
Age: 47
Discharge: HOME | End: 2022-09-03
Attending: EMERGENCY MEDICINE
Payer: MEDICAID

## 2022-09-03 VITALS
OXYGEN SATURATION: 99 % | SYSTOLIC BLOOD PRESSURE: 148 MMHG | DIASTOLIC BLOOD PRESSURE: 72 MMHG | HEIGHT: 71 IN | BODY MASS INDEX: 37.83 KG/M2 | TEMPERATURE: 98 F | WEIGHT: 270.2 LBS | HEART RATE: 79 BPM | RESPIRATION RATE: 19 BRPM

## 2022-09-03 DIAGNOSIS — K04.01 TOOTH PULPITIS: Primary | ICD-10-CM

## 2022-09-03 PROCEDURE — 99283 EMERGENCY DEPT VISIT LOW MDM: CPT

## 2022-09-03 PROCEDURE — 74011250637 HC RX REV CODE- 250/637: Performed by: EMERGENCY MEDICINE

## 2022-09-03 RX ORDER — CLINDAMYCIN HYDROCHLORIDE 150 MG/1
300 CAPSULE ORAL
Status: COMPLETED | OUTPATIENT
Start: 2022-09-03 | End: 2022-09-03

## 2022-09-03 RX ORDER — IBUPROFEN 600 MG/1
600 TABLET ORAL
Qty: 20 TABLET | Refills: 0 | Status: SHIPPED | OUTPATIENT
Start: 2022-09-03

## 2022-09-03 RX ORDER — CLINDAMYCIN HYDROCHLORIDE 300 MG/1
300 CAPSULE ORAL 4 TIMES DAILY
Qty: 28 CAPSULE | Refills: 0 | Status: SHIPPED | OUTPATIENT
Start: 2022-09-03 | End: 2022-09-10

## 2022-09-03 RX ORDER — IBUPROFEN 600 MG/1
600 TABLET ORAL
Status: COMPLETED | OUTPATIENT
Start: 2022-09-03 | End: 2022-09-03

## 2022-09-03 RX ADMIN — IBUPROFEN 600 MG: 600 TABLET, FILM COATED ORAL at 23:32

## 2022-09-03 RX ADMIN — CLINDAMYCIN HYDROCHLORIDE 300 MG: 150 CAPSULE ORAL at 23:32

## 2022-09-04 NOTE — ED PROVIDER NOTES
The patient is a 40-year-old woman with past medical history significant for bipolar disorder, GERD, anemia, anxiety and hiatal hernia, who presents to the ED today for throat and ear pain. She states that she thought it was her allergies initially. However, her throat became very sore and her jaws swollen. She states that she also has significant right ear pain with decreased hearing. She does have a history of sinusitis and thought maybe her sinuses are acting up. She states that she normally gets sinus infections this time of year. She also took a COVID test today that was negative. Past Medical History:   Diagnosis Date    Anemia     Anxiety     Bipolar affective disorder (HCC)     Gastrointestinal disorder     GERD (gastroesophageal reflux disease)     Gestational (pregnancy-induced) hypertension without significant proteinuria, complicating childbirth     Hiatal hernia     Vertigo        Past Surgical History:   Procedure Laterality Date    [place] ABDOMEN SURGERY PROC UNLISTED      intestinal blockage at age 9         History reviewed. No pertinent family history.     Social History     Socioeconomic History    Marital status: SINGLE     Spouse name: Not on file    Number of children: Not on file    Years of education: Not on file    Highest education level: Not on file   Occupational History    Not on file   Tobacco Use    Smoking status: Never    Smokeless tobacco: Never   Vaping Use    Vaping Use: Never used   Substance and Sexual Activity    Alcohol use: Not Currently    Drug use: Yes     Types: Marijuana     Comment: medical     Sexual activity: Not on file   Other Topics Concern    Not on file   Social History Narrative    Not on file     Social Determinants of Health     Financial Resource Strain: Not on file   Food Insecurity: Not on file   Transportation Needs: Not on file   Physical Activity: Not on file   Stress: Not on file   Social Connections: Not on file   Intimate Partner Violence: Not on file   Housing Stability: Not on file         ALLERGIES: Amoxicillin, Iodine, Pcn [penicillins], and Codeine    Review of Systems   All other systems reviewed and are negative. Vitals:    09/03/22 2158 09/03/22 2200   BP: (!) 148/72    Pulse: 79    Resp: 19    Temp: 98 °F (36.7 °C)    SpO2: 99%    Weight: 122.6 kg (270 lb 3.2 oz) 122.6 kg (270 lb 3.2 oz)   Height: 5' 11\" (1.803 m)             Physical Exam  Vitals and nursing note reviewed. Constitutional:       Appearance: She is obese. HENT:      Head: Normocephalic and atraumatic. Right Ear: Tympanic membrane and ear canal normal.      Left Ear: Tympanic membrane and ear canal normal.      Nose: Congestion present. Mouth/Throat:      Mouth: Mucous membranes are moist.      Pharynx: Oropharynx is clear. Tonsils: No tonsillar exudate or tonsillar abscesses. Comments: Extremely poor dentition. Tooth #32 is exquisitely tender to palpation. She has anterior chain cervical lymphadenopathy that is tender to palpation. She has some soft tissue swelling over that area of her mouth. Eyes:      Conjunctiva/sclera: Conjunctivae normal.      Pupils: Pupils are equal, round, and reactive to light. Cardiovascular:      Rate and Rhythm: Normal rate. Heart sounds: Normal heart sounds. Pulmonary:      Effort: Pulmonary effort is normal.      Breath sounds: Normal breath sounds. Abdominal:      General: Bowel sounds are normal.      Palpations: Abdomen is soft. Musculoskeletal:      Cervical back: Normal range of motion and neck supple. Skin:     General: Skin is warm and dry. Capillary Refill: Capillary refill takes less than 2 seconds. Neurological:      General: No focal deficit present. Mental Status: She is alert and oriented to person, place, and time.    Psychiatric:         Mood and Affect: Mood normal.         Behavior: Behavior normal.        MDM  Number of Diagnoses or Management Options  Diagnosis management comments: The patient is a 71-year-old woman who presents to the ED today with a sore throat and right ear pain. On exam, tooth #32 has significant tenderness to palpation, with some overlying soft tissue erythema and some lymphadenopathy. I believe that she has a dental infection. She will be discharged home with a prescription for clindamycin and Motrin. She has received 1 dose of each in the ED. She has been advised to follow-up with a dentist as soon as possible. Return precautions have been given.            Procedures

## 2022-12-27 ENCOUNTER — HOSPITAL ENCOUNTER (EMERGENCY)
Age: 47
Discharge: HOME | End: 2022-12-27
Attending: EMERGENCY MEDICINE
Payer: MEDICAID

## 2022-12-27 ENCOUNTER — APPOINTMENT (OUTPATIENT)
Dept: GENERAL RADIOLOGY | Age: 47
End: 2022-12-27
Attending: EMERGENCY MEDICINE
Payer: MEDICAID

## 2022-12-27 VITALS
DIASTOLIC BLOOD PRESSURE: 79 MMHG | TEMPERATURE: 97.6 F | BODY MASS INDEX: 38.61 KG/M2 | SYSTOLIC BLOOD PRESSURE: 121 MMHG | HEART RATE: 84 BPM | WEIGHT: 275.8 LBS | HEIGHT: 71 IN | OXYGEN SATURATION: 100 % | RESPIRATION RATE: 16 BRPM

## 2022-12-27 DIAGNOSIS — J10.1 INFLUENZA B: Primary | ICD-10-CM

## 2022-12-27 LAB
DEPRECATED S PYO AG THROAT QL EIA: NEGATIVE
FLUAV AG NPH QL IA: NEGATIVE
FLUBV AG NOSE QL IA: POSITIVE

## 2022-12-27 PROCEDURE — 99284 EMERGENCY DEPT VISIT MOD MDM: CPT

## 2022-12-27 PROCEDURE — 71045 X-RAY EXAM CHEST 1 VIEW: CPT

## 2022-12-27 PROCEDURE — 74011250637 HC RX REV CODE- 250/637: Performed by: EMERGENCY MEDICINE

## 2022-12-27 PROCEDURE — 87804 INFLUENZA ASSAY W/OPTIC: CPT

## 2022-12-27 PROCEDURE — 87880 STREP A ASSAY W/OPTIC: CPT

## 2022-12-27 RX ORDER — BENZONATATE 200 MG/1
200 CAPSULE ORAL
Qty: 20 CAPSULE | Refills: 0 | Status: SHIPPED | OUTPATIENT
Start: 2022-12-27 | End: 2022-12-27 | Stop reason: SDUPTHER

## 2022-12-27 RX ORDER — KETOROLAC TROMETHAMINE 10 MG/1
10 TABLET, FILM COATED ORAL
Qty: 12 TABLET | Refills: 0 | Status: SHIPPED | OUTPATIENT
Start: 2022-12-27

## 2022-12-27 RX ORDER — BENZONATATE 200 MG/1
200 CAPSULE ORAL
Qty: 20 CAPSULE | Refills: 0 | Status: SHIPPED | OUTPATIENT
Start: 2022-12-27 | End: 2023-01-03

## 2022-12-27 RX ORDER — KETOROLAC TROMETHAMINE 10 MG/1
10 TABLET, FILM COATED ORAL
Qty: 12 TABLET | Refills: 0 | Status: SHIPPED | OUTPATIENT
Start: 2022-12-27 | End: 2022-12-27 | Stop reason: SDUPTHER

## 2022-12-27 RX ORDER — PREDNISONE 20 MG/1
20 TABLET ORAL DAILY
COMMUNITY
Start: 2022-11-18

## 2022-12-27 RX ORDER — BENZONATATE 100 MG/1
200 CAPSULE ORAL
Status: DISCONTINUED | OUTPATIENT
Start: 2022-12-27 | End: 2022-12-27 | Stop reason: HOSPADM

## 2022-12-27 RX ORDER — OSELTAMIVIR PHOSPHATE 75 MG/1
75 CAPSULE ORAL 2 TIMES DAILY
Qty: 10 CAPSULE | Refills: 0 | Status: SHIPPED | OUTPATIENT
Start: 2022-12-27 | End: 2023-01-01

## 2022-12-27 RX ORDER — OSELTAMIVIR PHOSPHATE 75 MG/1
75 CAPSULE ORAL 2 TIMES DAILY
Qty: 10 CAPSULE | Refills: 0 | Status: SHIPPED | OUTPATIENT
Start: 2022-12-27 | End: 2022-12-27 | Stop reason: SDUPTHER

## 2022-12-27 RX ORDER — ACETAMINOPHEN 500 MG
1000 TABLET ORAL
Status: COMPLETED | OUTPATIENT
Start: 2022-12-27 | End: 2022-12-27

## 2022-12-27 RX ADMIN — ACETAMINOPHEN 1000 MG: 500 TABLET ORAL at 03:59

## 2022-12-27 RX ADMIN — BENZONATATE 200 MG: 100 CAPSULE ORAL at 03:58

## 2022-12-27 NOTE — ED TRIAGE NOTES
Patient states that she has had nasal drainage and coughing for the past for the past 2 days. Patient states that she thought it was just her allergies, but then tonight she had sudden onset shortness of breath. Patient states she took a home COVID test yesterday and it was negative.

## 2022-12-27 NOTE — LETTER
[facility] EMERGENCY DEPT  [address] [address]  [phone]    Work/School Note    Date: 12/27/2022    To Whom It May concern:    Ira Reyes was seen and treated today in the emergency room by the following provider(s):  Attending Provider: Quoc Gutierrez MD.      Ira Reyes may return to work on 12/31/2022.     Sincerely,          Jayshree Bolaños

## 2022-12-27 NOTE — ED PROVIDER NOTES
EMERGENCY DEPARTMENT HISTORY AND PHYSICAL EXAM      Date: 12/27/2022  Patient Name: Leandro Aviles    History of Presenting Illness     Chief Complaint   Patient presents with    Flu Like Symptoms       History Provided By: Patient    HPI: Leandro Aviles, 52 y.o. female presents to ED complaining of 2 days of flulike symptoms. She states that she began with generalized aching, nasal congestion, sore throat. Tonight she became short of breath, had slight wheezing and she used inhaler given by her  and she seemed to feel better. She reports her pulse ox was 86% prior to using the inhaler. Pulse ox in % on room air. Patient denies chest pain, she denies nausea or vomiting. She has no abdomen pain, vomiting or diarrhea. She reports her daughter had similar symptoms but has since recovered. Also reports father-in-law who she reports with this nursing home also had similar symptoms. Patient has been using her allergy medications with no relief. There are no other complaints, changes, or physical findings at this time. Past History     Past Medical History:  Past Medical History:   Diagnosis Date    Anemia     Anxiety     Bipolar affective disorder ([address]     Gastrointestinal disorder     GERD (gastroesophageal reflux disease)     Gestational (pregnancy-induced) hypertension without significant proteinuria, complicating childbirth     Hiatal hernia     Vertigo        Past Surgical History:  Past Surgical History:   Procedure Laterality Date    [place] ABDOMEN SURGERY PROC UNLISTED      intestinal blockage at age 9       Family History:  History reviewed. No pertinent family history. Social History:  Social History     Tobacco Use    Smoking status: Never    Smokeless tobacco: Never   Vaping Use    Vaping Use: Never used   Substance Use Topics    Alcohol use: Not Currently    Drug use: Yes     Types: Marijuana     Comment: medical        Allergies:   Allergies   Allergen Reactions    Amoxicillin Hives    Iodine Nausea and Vomiting    Pcn [Penicillins] Hives    Codeine Nausea and Vomiting       PCP: David Akins MD    No current facility-administered medications on file prior to encounter. Current Outpatient Medications on File Prior to Encounter   Medication Sig Dispense Refill    cimetidine (TAGAMET PO) Take  by mouth. fexofenadine (ALLEGRA) 60 mg tablet Take 60 mg by mouth daily. Indications: inflammation of the nose due to an allergy      predniSONE (DELTASONE) 20 mg tablet Take 20 mg by mouth daily. ibuprofen (MOTRIN) 600 mg tablet Take 1 Tablet by mouth every eight (8) hours as needed for Pain. 20 Tablet 0    ibuprofen (MOTRIN) 400 mg tablet Take 1 Tablet by mouth every six (6) hours as needed for Pain. 20 Tablet 0    diphenoxylate-atropine (LomotiL) 2.5-0.025 mg per tablet Take 1 Tab by mouth four (4) times daily as needed for Diarrhea. Max Daily Amount: 4 Tabs. (Patient not taking: Reported on 9/3/2022) 20 Tab 0       Review of Systems   Review of Systems   Constitutional:  Positive for chills. Negative for appetite change and fever. HENT:  Positive for congestion and sore throat. Respiratory:  Positive for cough and shortness of breath. Cardiovascular:  Negative for chest pain. Gastrointestinal:  Negative for abdominal distention, nausea and vomiting. Genitourinary:  Negative for difficulty urinating, dysuria, flank pain, frequency, hematuria, urgency, vaginal bleeding and vaginal discharge. Musculoskeletal:  Negative for arthralgias and joint swelling. Skin:  Negative for rash and wound. Neurological:  Negative for dizziness, weakness, light-headedness and headaches. Hematological:  Negative for adenopathy. Physical Exam   Physical Exam  Vitals and nursing note reviewed. Constitutional:       General: She is not in acute distress. Appearance: She is well-developed. She is obese. She is not diaphoretic.       Comments: Obese female who appears in no acute distress. HENT:      Head: Normocephalic and atraumatic. Jaw: No trismus. Right Ear: External ear normal. No swelling or tenderness. Tympanic membrane is not perforated, erythematous or bulging. Left Ear: External ear normal. No swelling or tenderness. Tympanic membrane is not perforated, erythematous or bulging. Nose: Nose normal. No mucosal edema or rhinorrhea. Right Sinus: No maxillary sinus tenderness or frontal sinus tenderness. Left Sinus: No maxillary sinus tenderness or frontal sinus tenderness. Mouth/Throat:      Mouth: No oral lesions. Dentition: No dental abscesses. Pharynx: Uvula midline. No oropharyngeal exudate, posterior oropharyngeal erythema or uvula swelling. Tonsils: No tonsillar abscesses. Eyes:      General: No scleral icterus. Right eye: No discharge. Left eye: No discharge. Conjunctiva/sclera: Conjunctivae normal.   Cardiovascular:      Rate and Rhythm: Normal rate and regular rhythm. Heart sounds: Normal heart sounds. No murmur heard. No friction rub. No gallop. Pulmonary:      Effort: Pulmonary effort is normal. No tachypnea, accessory muscle usage or respiratory distress. Breath sounds: Normal breath sounds. No decreased breath sounds, wheezing, rhonchi or rales. Abdominal:      General: Bowel sounds are normal.      Palpations: Abdomen is soft. Musculoskeletal:         General: No tenderness. Normal range of motion. Cervical back: Normal range of motion and neck supple. Lymphadenopathy:      Cervical: No cervical adenopathy. Skin:     General: Skin is warm and dry. Findings: No erythema or rash. Neurological:      General: No focal deficit present. Mental Status: She is alert and oriented to person, place, and time.    Psychiatric:         Judgment: Judgment normal.       Lab and Diagnostic Study Results   Labs -     Recent Results (from the past 12 hour(s))   INFLUENZA A & B AG (RAPID TEST)    Collection Time: 12/27/22  3:05 AM   Result Value Ref Range    Influenza A Antigen Negative Negative      Influenza B Antigen Positive (A) Negative     STREP AG SCREEN, GROUP A    Collection Time: 12/27/22  3:05 AM    Specimen: Serum; Throat   Result Value Ref Range    Group A Strep Ag ID Negative Negative         Radiologic Studies -   @lastxrresult@  CT Results  (Last 48 hours)      None          CXR Results  (Last 48 hours)                 12/27/22 0320  XR CHEST PORT Final result    Impression:  No acute cardiopulmonary findings. Narrative:  EXAM: Chest radiograph       CLINICAL INDICATION/HISTORY: Cough      --Additional history: None. COMPARISON: 11/08/2019       TECHNIQUE: Frontal view of the chest       _______________       FINDINGS:       SUPPORT DEVICES: None. HEART AND MEDIASTINUM: Cardiac silhouette is normal in size. Normal mediastinal   contours . Normal pulmonary vasculature. LUNGS AND PLEURAL SPACES: No focal airspace opacity. Sharp costophrenic sulci. No pneumothoraces. BONY THORAX AND SOFT TISSUES: Unremarkable.       _______________                   Medical Decision Making and ED Course   Differential Diagnosis & Medical Decision Making Provider Note:     Viral illness, URI, allergies, bronchitis, strep, COVID, GERD,    - I am the first provider for this patient. I reviewed the vital signs, available nursing notes, past medical history, past surgical history, family history and social history. The patients presenting problems have been discussed, and they are in agreement with the care plan formulated and outlined with them. I have encouraged them to ask questions as they arise throughout their visit. Vital Signs-Reviewed the patient's vital signs.   Patient Vitals for the past 12 hrs:   Temp Pulse Resp BP SpO2   12/27/22 0407 -- 84 16 121/79 100 %   12/27/22 0239 97.6 °F (36.4 °C) 83 17 (!) 151/61 99 %       ED Course:   Patient presents with flulike symptoms since 2 days ago. She reports some chills, cough with yellowish to brownish sputum, episode of shortness of breath with pulse ox done by  8086%, patient also states she had some wheezing and was given albuterol inhaler by her  which I relieved to hear shortness of breath. Patient also asked reports exposure to viral like illness with daughter and also with father-in-law. Chest x-ray read by me with no acute findings, she tested positive for influenza B. She was given 1 Gram Tylenol in ED and some Tessalon pearls which improved her symptoms. She is being discharged home on Tamiflu, Tessalon Perles and ketorolac. She was given instructions and precautions for returning to ED otherwise to follow-up with PCP. Procedures     Disposition   Disposition: Condition stable and improved  DC- Adult Discharges: All of the diagnostic tests were reviewed and questions answered. Diagnosis, care plan and treatment options were discussed. The patient understands the instructions and will follow up as directed. The patients results have been reviewed with them. They have been counseled regarding their diagnosis. The patient and spouse/SO verbally convey understanding and agreement of the signs, symptoms, diagnosis, treatment and prognosis and additionally agrees to follow up as recommended with their PCP in 24 - 48 hours. They also agree with the care-plan and convey that all of their questions have been answered. I have also put together some discharge instructions for them that include: 1) educational information regarding their diagnosis, 2) how to care for their diagnosis at home, as well a 3) list of reasons why they would want to return to the ED prior to their follow-up appointment, should their condition change. DISCHARGE PLAN:  1.    Current Discharge Medication List        CONTINUE these medications which have NOT CHANGED    Details   cimetidine (TAGAMET PO) Take by mouth. fexofenadine (ALLEGRA) 60 mg tablet Take 60 mg by mouth daily. Indications: inflammation of the nose due to an allergy      predniSONE (DELTASONE) 20 mg tablet Take 20 mg by mouth daily. !! ibuprofen (MOTRIN) 600 mg tablet Take 1 Tablet by mouth every eight (8) hours as needed for Pain. Qty: 20 Tablet, Refills: 0      !! ibuprofen (MOTRIN) 400 mg tablet Take 1 Tablet by mouth every six (6) hours as needed for Pain. Qty: 20 Tablet, Refills: 0      diphenoxylate-atropine (LomotiL) 2.5-0.025 mg per tablet Take 1 Tab by mouth four (4) times daily as needed for Diarrhea. Max Daily Amount: 4 Tabs. Qty: 20 Tab, Refills: 0    Associated Diagnoses: Diarrhea, unspecified type       !! - Potential duplicate medications found. Please discuss with provider. 2.   Follow-up Information       Follow up With Specialties Details Why Contact Info    Toby Soto MD Internal Medicine Physician In 3 days  [address] [facility] [address]  [phone]      [facility] EMERGENCY DEPT Emergency Medicine  If symptoms worsen [facility] [address] [id number]  [phone]          3. Return to ED if worse   4. Discharge Medication List as of 12/27/2022  3:40 AM        START taking these medications    Details   oseltamivir (Tamiflu) 75 mg capsule Take 1 Capsule by mouth two (2) times a day for 5 days. , Normal, Disp-10 Capsule, R-0      benzonatate (TESSALON) 200 mg capsule Take 1 Capsule by mouth three (3) times daily as needed for Cough for up to 7 days. , Normal, Disp-20 Capsule, R-0      ketorolac (TORADOL) 10 mg tablet Take 1 Tablet by mouth every eight (8) hours as needed for Pain., Normal, Disp-12 Tablet, R-0           CONTINUE these medications which have NOT CHANGED    Details   cimetidine (TAGAMET PO) Take  by mouth., Historical Med      fexofenadine (ALLEGRA) 60 mg tablet Take 60 mg by mouth daily.  Indications: inflammation of the nose due to an allergy, Historical Med      predniSONE (DELTASONE) 20 mg tablet Take 20 mg by mouth daily. , Historical Med      !! ibuprofen (MOTRIN) 600 mg tablet Take 1 Tablet by mouth every eight (8) hours as needed for Pain., Normal, Disp-20 Tablet, R-0      !! ibuprofen (MOTRIN) 400 mg tablet Take 1 Tablet by mouth every six (6) hours as needed for Pain., Normal, Disp-20 Tablet, R-0      diphenoxylate-atropine (LomotiL) 2.5-0.025 mg per tablet Take 1 Tab by mouth four (4) times daily as needed for Diarrhea. Max Daily Amount: 4 Tabs., Normal, Disp-20 Tab, R-0       !! - Potential duplicate medications found. Please discuss with provider. Remove if admitted/transferred    Diagnosis/Clinical Impression     Clinical Impression:   1. Influenza B        Attestations: Jerman Caballero MD, am the primary clinician of record. Please note that this dictation was completed with [email or web address], the computer voice recognition software. Quite often unanticipated grammatical, syntax, homophones, and other interpretive errors are inadvertently transcribed by the computer software. Please disregard these errors. Please excuse any errors that have escaped final proofreading. Thank you.

## 2023-06-28 ENCOUNTER — HOSPITAL ENCOUNTER (EMERGENCY)
Age: 48
Discharge: HOME | End: 2023-06-28
Attending: FAMILY MEDICINE
Payer: MEDICAID

## 2023-06-28 VITALS
WEIGHT: 246 LBS | OXYGEN SATURATION: 98 % | RESPIRATION RATE: 20 BRPM | BODY MASS INDEX: 34.44 KG/M2 | SYSTOLIC BLOOD PRESSURE: 165 MMHG | DIASTOLIC BLOOD PRESSURE: 77 MMHG | TEMPERATURE: 98.4 F | HEART RATE: 81 BPM | HEIGHT: 71 IN

## 2023-06-28 DIAGNOSIS — R11.2 NAUSEA AND VOMITING, UNSPECIFIED VOMITING TYPE: Primary | ICD-10-CM

## 2023-06-28 DIAGNOSIS — N30.01 ACUTE CYSTITIS WITH HEMATURIA: ICD-10-CM

## 2023-06-28 LAB
APPEARANCE UR: CLEAR
BACTERIA URNS QL MICRO: ABNORMAL /HPF
BILIRUB UR QL: NEGATIVE
COLOR UR: YELLOW
EPITH CASTS URNS QL MICRO: ABNORMAL /LPF (ref 0–20)
GLUCOSE UR STRIP.AUTO-MCNC: NEGATIVE MG/DL
HGB UR QL STRIP: ABNORMAL
KETONES UR QL STRIP.AUTO: ABNORMAL MG/DL
LEUKOCYTE ESTERASE UR QL STRIP.AUTO: ABNORMAL
MUCOUS THREADS URNS QL MICRO: ABNORMAL /LPF
NITRITE UR QL STRIP.AUTO: NEGATIVE
PH UR STRIP: 6 (ref 5–8)
PROT UR STRIP-MCNC: NEGATIVE MG/DL
RBC #/AREA URNS HPF: ABNORMAL /HPF (ref 0–2)
SP GR UR REFRACTOMETRY: 1.03 (ref 1–1.03)
UROBILINOGEN UR QL STRIP.AUTO: 1 EU/DL (ref 0.2–1)
WBC URNS QL MICRO: ABNORMAL /HPF (ref 0–4)

## 2023-06-28 PROCEDURE — 96375 TX/PRO/DX INJ NEW DRUG ADDON: CPT

## 2023-06-28 PROCEDURE — 2580000003 HC RX 258: Performed by: FAMILY MEDICINE

## 2023-06-28 PROCEDURE — 99284 EMERGENCY DEPT VISIT MOD MDM: CPT

## 2023-06-28 PROCEDURE — 6360000002 HC RX W HCPCS: Performed by: FAMILY MEDICINE

## 2023-06-28 PROCEDURE — 96361 HYDRATE IV INFUSION ADD-ON: CPT

## 2023-06-28 PROCEDURE — 96374 THER/PROPH/DIAG INJ IV PUSH: CPT

## 2023-06-28 PROCEDURE — 81001 URINALYSIS AUTO W/SCOPE: CPT

## 2023-06-28 RX ORDER — ONDANSETRON 4 MG/1
4 TABLET, ORALLY DISINTEGRATING ORAL 3 TIMES DAILY PRN
Qty: 21 TABLET | Refills: 0 | Status: SHIPPED | OUTPATIENT
Start: 2023-06-28

## 2023-06-28 RX ORDER — 0.9 % SODIUM CHLORIDE 0.9 %
1000 INTRAVENOUS SOLUTION INTRAVENOUS ONCE
Status: DISCONTINUED | OUTPATIENT
Start: 2023-06-28 | End: 2023-06-28

## 2023-06-28 RX ORDER — 0.9 % SODIUM CHLORIDE 0.9 %
1000 INTRAVENOUS SOLUTION INTRAVENOUS ONCE
Status: COMPLETED | OUTPATIENT
Start: 2023-06-28 | End: 2023-06-28

## 2023-06-28 RX ORDER — ONDANSETRON 2 MG/ML
4 INJECTION INTRAMUSCULAR; INTRAVENOUS ONCE
Status: COMPLETED | OUTPATIENT
Start: 2023-06-28 | End: 2023-06-28

## 2023-06-28 RX ORDER — KETOROLAC TROMETHAMINE 30 MG/ML
15 INJECTION, SOLUTION INTRAMUSCULAR; INTRAVENOUS ONCE
Status: COMPLETED | OUTPATIENT
Start: 2023-06-28 | End: 2023-06-28

## 2023-06-28 RX ORDER — PROCHLORPERAZINE EDISYLATE 5 MG/ML
10 INJECTION INTRAMUSCULAR; INTRAVENOUS
Status: COMPLETED | OUTPATIENT
Start: 2023-06-28 | End: 2023-06-28

## 2023-06-28 RX ORDER — SULFAMETHOXAZOLE AND TRIMETHOPRIM 800; 160 MG/1; MG/1
1 TABLET ORAL 2 TIMES DAILY
Qty: 6 TABLET | Refills: 0 | Status: SHIPPED | OUTPATIENT
Start: 2023-06-28 | End: 2023-07-01

## 2023-06-28 RX ADMIN — PROCHLORPERAZINE EDISYLATE 10 MG: 5 INJECTION INTRAMUSCULAR; INTRAVENOUS at 02:35

## 2023-06-28 RX ADMIN — ONDANSETRON 4 MG: 2 INJECTION INTRAMUSCULAR; INTRAVENOUS at 03:25

## 2023-06-28 RX ADMIN — KETOROLAC TROMETHAMINE 15 MG: 30 INJECTION, SOLUTION INTRAMUSCULAR; INTRAVENOUS at 02:38

## 2023-06-28 RX ADMIN — SODIUM CHLORIDE 1000 ML: 9 INJECTION, SOLUTION INTRAVENOUS at 02:32

## 2023-06-28 ASSESSMENT — PAIN SCALES - GENERAL
PAINLEVEL_OUTOF10: 8
PAINLEVEL_OUTOF10: 8

## 2023-06-28 ASSESSMENT — PAIN - FUNCTIONAL ASSESSMENT: PAIN_FUNCTIONAL_ASSESSMENT: 0-10

## 2023-06-28 ASSESSMENT — PAIN DESCRIPTION - LOCATION
LOCATION: HEAD
LOCATION: HEAD

## 2023-06-28 ASSESSMENT — PAIN DESCRIPTION - DESCRIPTORS
DESCRIPTORS: SQUEEZING;ACHING
DESCRIPTORS: ACHING;THROBBING

## 2023-08-10 ENCOUNTER — HOSPITAL ENCOUNTER (EMERGENCY)
Age: 48
Discharge: HOME | End: 2023-08-10
Attending: FAMILY MEDICINE
Payer: MEDICAID

## 2023-08-10 VITALS
SYSTOLIC BLOOD PRESSURE: 121 MMHG | OXYGEN SATURATION: 96 % | HEART RATE: 114 BPM | BODY MASS INDEX: 34.02 KG/M2 | HEIGHT: 71 IN | DIASTOLIC BLOOD PRESSURE: 59 MMHG | TEMPERATURE: 98.4 F | WEIGHT: 243 LBS | RESPIRATION RATE: 20 BRPM

## 2023-08-10 DIAGNOSIS — K02.9 PAIN DUE TO DENTAL CARIES: Primary | ICD-10-CM

## 2023-08-10 PROCEDURE — 99283 EMERGENCY DEPT VISIT LOW MDM: CPT

## 2023-08-10 PROCEDURE — 6370000000 HC RX 637 (ALT 250 FOR IP): Performed by: FAMILY MEDICINE

## 2023-08-10 RX ORDER — IBUPROFEN 400 MG/1
400 TABLET ORAL ONCE
Status: COMPLETED | OUTPATIENT
Start: 2023-08-10 | End: 2023-08-10

## 2023-08-10 RX ORDER — CLINDAMYCIN HYDROCHLORIDE 150 MG/1
600 CAPSULE ORAL
Status: COMPLETED | OUTPATIENT
Start: 2023-08-10 | End: 2023-08-10

## 2023-08-10 RX ORDER — CLINDAMYCIN HYDROCHLORIDE 300 MG/1
300 CAPSULE ORAL 4 TIMES DAILY
Qty: 40 CAPSULE | Refills: 0 | Status: SHIPPED | OUTPATIENT
Start: 2023-08-10 | End: 2023-08-20

## 2023-08-10 RX ADMIN — CLINDAMYCIN HYDROCHLORIDE 600 MG: 150 CAPSULE ORAL at 20:48

## 2023-08-10 RX ADMIN — IBUPROFEN 400 MG: 400 TABLET, FILM COATED ORAL at 20:49

## 2023-08-10 ASSESSMENT — PAIN - FUNCTIONAL ASSESSMENT: PAIN_FUNCTIONAL_ASSESSMENT: 0-10

## 2023-08-10 ASSESSMENT — PAIN SCALES - GENERAL: PAINLEVEL_OUTOF10: 10

## 2023-08-11 NOTE — ED NOTES
I have reviewed discharge instructions with the patient. The patient verbalized understanding.       Mauor Salmon RN  08/10/23 [id number]

## 2023-08-11 NOTE — DISCHARGE INSTRUCTIONS
At this point I believe that you need the majority of your teeth extracted. My recommendation is to touch base with the dental school in [facility]. They may be able to get you in sooner at a reduced rate. Touch base your primary care doctor. First dose of antibiotics here in the emergency department next dose will be in the morning time. Use 2 regular strength ibuprofen tablets every 4 hours for pain control. Return to the emergency department if you have any questions or concerns. Can also supplement 2 regular strength 500 mg tablets every 6-8 hours for additional pain relief. Recommend eating some yogurt while taking clindamycin. Heart disease has been linked to bad dental decay.

## 2023-08-11 NOTE — ED TRIAGE NOTES
Pt to room via wheelchair, c/o left lower dental pain and swelling x 3 days, last ibuprofen aprox 1500 with some relief for short term. States unable to get dental appointment.

## 2023-08-11 NOTE — ED PROVIDER NOTES
[facility] EMERGENCY DEPT  EMERGENCY DEPARTMENT ENCOUNTER      Pt Name: Panda Wade  MRN: [id number]  [address] [facility] 1975  Date of evaluation: 8/10/2023  Provider: Carline Pulido DO    CHIEF COMPLAINT       Chief Complaint   Patient presents with    Dental Problem         HISTORY OF PRESENT ILLNESS   (Location/Symptom, Timing/Onset, Context/Setting, Quality, Duration, Modifying Factors, Severity)  Note limiting factors. Panda Wade is a 50 y.o. female who presents to the emergency department patient comes in stating been having some pain in the left lower jaw for approximately 3 days now progressively getting worse. States this started off with a sinus infection believes its migrated down. She did take some ibuprofen 400 mg at 3:00 today. And states that it it did help. She claims to not be able to get a dental appointment for approximately a year and a half even with paying cash. She states that she has not had a fever never went above 100 but has had some hot flashes. She states her pain is severe now    HPI    Nursing Notes were reviewed. REVIEW OF SYSTEMS    (2-9 systems for level 4, 10 or more for level 5)     Review of Systems   HENT:  Positive for dental problem. All other systems reviewed and are negative. Except as noted above the remainder of the review of systems was reviewed and negative.        PAST MEDICAL HISTORY     Past Medical History:   Diagnosis Date    Anemia     Anxiety     Bipolar affective disorder ([address])     Gastrointestinal disorder     GERD (gastroesophageal reflux disease)     Gestational (pregnancy-induced) hypertension without significant proteinuria, complicating childbirth     Hiatal hernia     Vertigo          SURGICAL HISTORY       Past Surgical History:   Procedure Laterality Date    [place] UNLISTED PROCEDURE ABDOMEN PERITONEUM & OMENTUM      intestinal blockage at age 9         CURRENT MEDICATIONS       Previous Medications    FEXOFENADINE (ALLEGRA) 60 MG TABLET    Take

## 2024-01-22 ENCOUNTER — HOSPITAL ENCOUNTER (EMERGENCY)
Age: 49
Discharge: HOME | End: 2024-01-22
Attending: EMERGENCY MEDICINE
Payer: MEDICAID

## 2024-01-22 VITALS
TEMPERATURE: 98.1 F | RESPIRATION RATE: 19 BRPM | DIASTOLIC BLOOD PRESSURE: 90 MMHG | HEIGHT: 71 IN | BODY MASS INDEX: 32.2 KG/M2 | SYSTOLIC BLOOD PRESSURE: 139 MMHG | WEIGHT: 230 LBS | HEART RATE: 86 BPM | OXYGEN SATURATION: 100 %

## 2024-01-22 DIAGNOSIS — J06.9 URI WITH COUGH AND CONGESTION: Primary | ICD-10-CM

## 2024-01-22 PROCEDURE — 99283 EMERGENCY DEPT VISIT LOW MDM: CPT

## 2024-01-22 RX ORDER — ALBUTEROL SULFATE 90 UG/1
2 AEROSOL, METERED RESPIRATORY (INHALATION) 4 TIMES DAILY PRN
Qty: 18 G | Refills: 0 | Status: SHIPPED | OUTPATIENT
Start: 2024-01-22

## 2024-01-22 RX ORDER — BENZONATATE 200 MG/1
200 CAPSULE ORAL 3 TIMES DAILY PRN
Qty: 30 CAPSULE | Refills: 0 | Status: SHIPPED | OUTPATIENT
Start: 2024-01-22 | End: 2024-01-29

## 2024-01-22 RX ORDER — OMEPRAZOLE 20 MG/1
20 CAPSULE, DELAYED RELEASE ORAL DAILY
COMMUNITY

## 2024-01-22 ASSESSMENT — LIFESTYLE VARIABLES
HOW OFTEN DO YOU HAVE A DRINK CONTAINING ALCOHOL: NEVER
HOW MANY STANDARD DRINKS CONTAINING ALCOHOL DO YOU HAVE ON A TYPICAL DAY: PATIENT DOES NOT DRINK

## 2024-01-22 ASSESSMENT — PAIN DESCRIPTION - PAIN TYPE: TYPE: ACUTE PAIN

## 2024-01-22 ASSESSMENT — PAIN SCALES - GENERAL: PAINLEVEL_OUTOF10: 9

## 2024-01-22 ASSESSMENT — PAIN DESCRIPTION - LOCATION: LOCATION: HEAD

## 2024-01-22 NOTE — DISCHARGE INSTRUCTIONS
Try the following to help reduce nasal congestion:    Saline irrigation every 2 hours as needed, then blow your nose.  Nasal steroid such as Nasacort or Flonase to help reduce congestion.  This will take approximately 2 to 3 days to be maximally effective.  If you have significant nasal congestion you may try oxymetazoline or phenylephrine nasal spray for up to 2 days.  Any longer use may result in worsened congestion.  Prop your head up at nighttime to aid drainage and help reduce sore throat.    Try the following to help reduce cough:    Albuterol inhaler as prescribed.  Tessalon Perles may be used during the day or night.  Codeine may be used at nighttime if prescribed.  Avoid driving for approximately 6 hours afterwards as this may cause sleepiness.  You may try using Robitussin DM (dextromethorphan) at nighttime to reduce cough and aid sleep.

## 2024-01-22 NOTE — ED PROVIDER NOTES
None    Number of children: None    Years of education: None    Highest education level: None   Tobacco Use    Smoking status: Never    Smokeless tobacco: Never   Vaping Use    Vaping Use: Never used   Substance and Sexual Activity    Alcohol use: Not Currently    Drug use: Yes     Types: Marijuana (Weed)       SCREENINGS         [place] Coma Scale  Eye Opening: Spontaneous  Best Verbal Response: Oriented  Best Motor Response: Obeys commands  [place] Coma Scale Score: 15                     CIWA Assessment  BP: (!) 139/90  Pulse: 86                 PHYSICAL EXAM       ED Triage Vitals [01/22/24 1107]   BP Temp Temp Source Pulse Respirations SpO2 Height Weight - Scale   (!) 139/90 98.1 °F (36.7 °C) Oral 86 19 100 % 1.803 m (5' 11\") 104.3 kg (230 lb)       Physical Exam  Vitals and nursing note reviewed.   Constitutional:       General: She is not in acute distress.     Appearance: Normal appearance. She is not ill-appearing, toxic-appearing or diaphoretic.   HENT:      Head: Normocephalic and atraumatic.      Right Ear: Tympanic membrane, ear canal and external ear normal.      Left Ear: Tympanic membrane, ear canal and external ear normal.      Nose: Congestion present. No rhinorrhea.      Mouth/Throat:      Mouth: Mucous membranes are moist.      Pharynx: Oropharynx is clear. No oropharyngeal exudate or posterior oropharyngeal erythema.   Eyes:      General: No scleral icterus.        Right eye: No discharge.         Left eye: No discharge.      Extraocular Movements: Extraocular movements intact.      Conjunctiva/sclera: Conjunctivae normal.      Pupils: Pupils are equal, round, and reactive to light.   Neck:      Vascular: No carotid bruit.   Cardiovascular:      Rate and Rhythm: Normal rate and regular rhythm.      Pulses: Normal pulses.      Heart sounds: Normal heart sounds. No murmur heard.     No friction rub. No gallop.   Pulmonary:      Effort: Pulmonary effort is normal. No respiratory distress.

## 2024-01-22 NOTE — ED TRIAGE NOTES
Pt reports productive cough that hurts her rib cage, body aches, and head ache that started yesterday.